# Patient Record
Sex: FEMALE | Race: BLACK OR AFRICAN AMERICAN | NOT HISPANIC OR LATINO
[De-identification: names, ages, dates, MRNs, and addresses within clinical notes are randomized per-mention and may not be internally consistent; named-entity substitution may affect disease eponyms.]

---

## 2020-07-03 ENCOUNTER — TRANSCRIPTION ENCOUNTER (OUTPATIENT)
Age: 30
End: 2020-07-03

## 2023-06-16 ENCOUNTER — EMERGENCY (EMERGENCY)
Facility: HOSPITAL | Age: 33
LOS: 1 days | Discharge: HOME | End: 2023-06-16
Attending: STUDENT IN AN ORGANIZED HEALTH CARE EDUCATION/TRAINING PROGRAM | Admitting: STUDENT IN AN ORGANIZED HEALTH CARE EDUCATION/TRAINING PROGRAM
Payer: MEDICAID

## 2023-06-16 VITALS
HEART RATE: 97 BPM | DIASTOLIC BLOOD PRESSURE: 68 MMHG | TEMPERATURE: 99 F | SYSTOLIC BLOOD PRESSURE: 119 MMHG | RESPIRATION RATE: 16 BRPM | OXYGEN SATURATION: 100 %

## 2023-06-16 LAB
ALBUMIN SERPL ELPH-MCNC: 4.2 G/DL — SIGNIFICANT CHANGE UP (ref 3.3–5)
ALP SERPL-CCNC: 68 U/L — SIGNIFICANT CHANGE UP (ref 40–120)
ALT FLD-CCNC: 11 U/L — SIGNIFICANT CHANGE UP (ref 4–33)
ANION GAP SERPL CALC-SCNC: 13 MMOL/L — SIGNIFICANT CHANGE UP (ref 7–14)
AST SERPL-CCNC: 14 U/L — SIGNIFICANT CHANGE UP (ref 4–32)
BASOPHILS # BLD AUTO: 0.01 K/UL — SIGNIFICANT CHANGE UP (ref 0–0.2)
BASOPHILS NFR BLD AUTO: 0.1 % — SIGNIFICANT CHANGE UP (ref 0–2)
BILIRUB SERPL-MCNC: <0.2 MG/DL — SIGNIFICANT CHANGE UP (ref 0.2–1.2)
BLD GP AB SCN SERPL QL: NEGATIVE — SIGNIFICANT CHANGE UP
BUN SERPL-MCNC: 6 MG/DL — LOW (ref 7–23)
CALCIUM SERPL-MCNC: 9.4 MG/DL — SIGNIFICANT CHANGE UP (ref 8.4–10.5)
CHLORIDE SERPL-SCNC: 101 MMOL/L — SIGNIFICANT CHANGE UP (ref 98–107)
CO2 SERPL-SCNC: 22 MMOL/L — SIGNIFICANT CHANGE UP (ref 22–31)
CREAT SERPL-MCNC: 0.64 MG/DL — SIGNIFICANT CHANGE UP (ref 0.5–1.3)
EGFR: 119 ML/MIN/1.73M2 — SIGNIFICANT CHANGE UP
EOSINOPHIL # BLD AUTO: 0.11 K/UL — SIGNIFICANT CHANGE UP (ref 0–0.5)
EOSINOPHIL NFR BLD AUTO: 1 % — SIGNIFICANT CHANGE UP (ref 0–6)
GLUCOSE SERPL-MCNC: 97 MG/DL — SIGNIFICANT CHANGE UP (ref 70–99)
HCG SERPL-ACNC: SIGNIFICANT CHANGE UP MIU/ML
HCT VFR BLD CALC: 34.3 % — LOW (ref 34.5–45)
HGB BLD-MCNC: 11.3 G/DL — LOW (ref 11.5–15.5)
IANC: 7.93 K/UL — HIGH (ref 1.8–7.4)
IMM GRANULOCYTES NFR BLD AUTO: 0.3 % — SIGNIFICANT CHANGE UP (ref 0–0.9)
LYMPHOCYTES # BLD AUTO: 2.54 K/UL — SIGNIFICANT CHANGE UP (ref 1–3.3)
LYMPHOCYTES # BLD AUTO: 22 % — SIGNIFICANT CHANGE UP (ref 13–44)
MCHC RBC-ENTMCNC: 30.9 PG — SIGNIFICANT CHANGE UP (ref 27–34)
MCHC RBC-ENTMCNC: 32.9 GM/DL — SIGNIFICANT CHANGE UP (ref 32–36)
MCV RBC AUTO: 93.7 FL — SIGNIFICANT CHANGE UP (ref 80–100)
MONOCYTES # BLD AUTO: 0.94 K/UL — HIGH (ref 0–0.9)
MONOCYTES NFR BLD AUTO: 8.1 % — SIGNIFICANT CHANGE UP (ref 2–14)
NEUTROPHILS # BLD AUTO: 7.93 K/UL — HIGH (ref 1.8–7.4)
NEUTROPHILS NFR BLD AUTO: 68.5 % — SIGNIFICANT CHANGE UP (ref 43–77)
NRBC # BLD: 0 /100 WBCS — SIGNIFICANT CHANGE UP (ref 0–0)
NRBC # FLD: 0 K/UL — SIGNIFICANT CHANGE UP (ref 0–0)
PLATELET # BLD AUTO: 338 K/UL — SIGNIFICANT CHANGE UP (ref 150–400)
POTASSIUM SERPL-MCNC: 3.5 MMOL/L — SIGNIFICANT CHANGE UP (ref 3.5–5.3)
POTASSIUM SERPL-SCNC: 3.5 MMOL/L — SIGNIFICANT CHANGE UP (ref 3.5–5.3)
PROT SERPL-MCNC: 7.4 G/DL — SIGNIFICANT CHANGE UP (ref 6–8.3)
RBC # BLD: 3.66 M/UL — LOW (ref 3.8–5.2)
RBC # FLD: 13.2 % — SIGNIFICANT CHANGE UP (ref 10.3–14.5)
RH IG SCN BLD-IMP: POSITIVE — SIGNIFICANT CHANGE UP
SODIUM SERPL-SCNC: 136 MMOL/L — SIGNIFICANT CHANGE UP (ref 135–145)
WBC # BLD: 11.56 K/UL — HIGH (ref 3.8–10.5)
WBC # FLD AUTO: 11.56 K/UL — HIGH (ref 3.8–10.5)

## 2023-06-16 PROCEDURE — 99285 EMERGENCY DEPT VISIT HI MDM: CPT

## 2023-06-16 PROCEDURE — 76817 TRANSVAGINAL US OBSTETRIC: CPT | Mod: 26

## 2023-06-16 NOTE — ED PROVIDER NOTE - PHYSICAL EXAMINATION
GENERAL: Awake, alert, NAD  HEENT: NC/AT, moist mucous membranes, PERRL, EOMI  LUNGS: CTAB, no wheezes or crackles   CARDIAC: RRR, no m/r/g  ABDOMEN: Soft, non tender, non distended, no rebound, no guarding  : cervical os closed, physiological discharge, no blood appreciated.   BACK: No midline spinal tenderness, no CVA tenderness  EXT: No edema, no calf tenderness, 2+ DP pulses bilaterally, no deformities.  NEURO: A&Ox3. Moving all extremities.  SKIN: Warm and dry. No rash.  PSYCH: Normal affect.

## 2023-06-16 NOTE — ED PROVIDER NOTE - NSFOLLOWUPCLINICS_GEN_ALL_ED_FT
[facility]  OB/GYN & Surg  [phone] [place] [address]  Phone: [phone]  Fax:

## 2023-06-16 NOTE — ED PROVIDER NOTE - NSFOLLOWUPINSTRUCTIONS_ED_ALL_ED_FT
Today you were evaluated for no detection of fetal heart rate.   On ultrasound there was evidence of embryonic demise.     Over the next few days you should expect menstruation to happen. If you begin to experience sever pain, become light headed or your are passing large clots, having fevers or chills or please return to the hospital to be evaluates. In the meantime please follow up with our OB/GYN clinic for evaluation for D&C.      Miscarriage  A miscarriage is the loss of a pregnancy before the 20th week of pregnancy. Sometimes, a pregnancy ends before a woman knows that she is pregnant.    If you lose a pregnancy, talk with your doctor about:  Questions you have about the loss of your baby.  How to work through your grief.  Plans for future pregnancy.  What are the causes?  Many times, the cause of this condition is not known.    What increases the risk?  These things may make a pregnant woman more likely to lose a pregnancy:    Certain health conditions    Conditions that affect hormones, such as:  Thyroid disease.  Polycystic ovary syndrome.  Diabetes.  A disease that causes the body's disease-fighting system to attack itself by mistake.  Infections.  Bleeding problems.  Being very overweight.  Lifestyle factors    Using products that have tobacco or nicotine in them.  Being around tobacco smoke.  Having alcohol.  Having a lot of caffeine.  Using drugs.  Problems with reproductive organs or parts    Having a cervix that opens and thins before your due date. The cervix is the lowest part of your womb.  Having Asherman syndrome, which leads to:  Scars in the womb.  The womb being abnormal in shape.  Growths (fibroids) in the womb.  Problems in the body that are present at birth.  Infection of the cervix or womb.  Personal or health history    Injury.  Having lost a pregnancy before.  Being younger than age 18 or older than age 35.  Being around a harmful substance, such as radiation.  Having lead or other heavy metals in:  Things you eat or drink.  The air around you.  Using certain medicines.  What are the signs or symptoms?  Blood or spots of blood coming from the vagina. You may also have cramps or pain.  Pain or cramps in the belly or low back.  Fluid or tissue coming out of the vagina.  How is this treated?  Sometimes, treatment is not needed.    If you need treatment, you may be treated with:  A procedure to open the cervix more and take tissue out of the womb.  Medicines. You may get a shot of medicine called Rho(D) immune globulin.  Follow these instructions at home:  Medicines    Take over-the-counter and prescription medicines only as told by your doctor.  If you were prescribed antibiotic medicine, take it as told by your doctor. Do not stop taking it even if you start to feel better.  Activity    Rest as told by your doctor. Ask your doctor what activities are safe for you.  Have someone help you at home during this time.  General instructions      Watch how much tissue comes out of the vagina.  Watch the size of any blood clots that come out of the vagina.  Do not have sex or douche until your doctor says it is okay.  Do not put things, such as tampons, in your vagina until your doctor says it is okay.  To help you and your partner with grieving:  Talk with your doctor.  See a counselor.  When you are ready, talk with your doctor about:  Things to do for your health.  How you can be healthy if you get pregnant again.  Keep all follow-up visits.  Where to find more information  The American College of Obstetricians and Gynecologists: acog.org  [place] Department of Health and Human Services Office of Women's Health: hrsa.gov/office-womens-health  Contact a doctor if:  You have a fever or chills.  There is bad-smelling fluid coming from the vagina.  You have more bleeding.  Tissue or clots of blood come out of your vagina.  Get help right away if:  You have very bad cramps or pain in your back or belly.  You soak more than 2 large pads in an hour for more than 2 hours.  You get light-headed or weak.  You faint.  You feel sad, and you have sad thoughts a lot of the time.  You think about hurting yourself.  Get help right away if you feel like you may hurt yourself or others, or have thoughts about taking your own life. Go to your nearest emergency room or:  Call your local emergency services (076 in the [place]  Call the National Suicide Prevention Lifeline at 1-570.769.6187 or 976 in the [place] This is open 24 hours a day.  Text the Crisis Text Line at 875137.  Summary  A miscarriage is the loss of a pregnancy before the 20th week of pregnancy. Sometimes, a pregnancy ends before a woman knows that she is pregnant.  Follow instructions from your doctor about medicines and activity.  To help you and your partner with grieving, talk with your doctor or a counselor.  Keep all follow-up visits.  This information is not intended to replace advice given to you by your health care provider. Make sure you discuss any questions you have with your health care provider.

## 2023-06-16 NOTE — ED PROVIDER NOTE - CLINICAL SUMMARY MEDICAL DECISION MAKING FREE TEXT BOX
Will evaluate for fetal demise. Patient does not appear to be displaying any evidence of infection. Will also evaluate for Rh status patient.  Dispo pending lab work and imaging.  We will consider consulting with OB.

## 2023-06-16 NOTE — ED ADULT TRIAGE NOTE - CHIEF COMPLAINT QUOTE
Pregnancy confirmed via U/S by OB on 23. Pt was told there is no heartbeat at yesterday's OBGYN appointment. Denies any vaginal bleeding or pelvic pain. LMP 4/10/23. .

## 2023-06-16 NOTE — ED PROVIDER NOTE - PATIENT PORTAL LINK FT
You can access the FollowMyHealth Patient Portal offered by [facility] by registering at the following website: [email or web address]. By joining [facility]’s FollowMyHealth portal, you will also be able to view your health information using other applications (apps) compatible with our system.

## 2023-06-16 NOTE — ED PROVIDER NOTE - ATTENDING CONTRIBUTION TO CARE
I have personally performed a face to face medical and diagnostic evaluation of the patient. I have discussed with and reviewed the Resident's note and agree with the History, ROS, Physical Exam and MDM unless otherwise indicated. A brief summary of my personal evaluation and impression can be found below.    Nisa BENJAMIN: 33-year-old female no past medical history G1, P0 seen by OB/GYN yesterday about 11 weeks pregnant was told had a concern for possible fetal demise as had an ultrasound yesterday that did not detect a heartbeat, she was told she would likely need a D&C, attempted to follow-up with an outpatient provider, reports however was unable to be seen for weeks prompting her visit tonight, she has no abdominal pain, no vaginal bleeding or discharge and no fever prior to this she had a confirmed viable IUP, no chest pain shortness of breath headache or visual changes she can move everything feeling no urinary or bowel complaints.    All other ROS negative, except as above and as per HPI and ROS section.    VITALS: Initial triage and subsequent vitals have been reviewed by me.  GEN APPEARANCE: Alert, non-toxic, well-appearing, NAD.  HEAD: Atraumatic.  EYES: PERRLa, EOMI, vision grossly intact.   NECK: Supple  CV: RRR, S1S2, no c/r/m/g. Cap refill <2 seconds. No bruits.   LUNGS: CTAB. No abnormal breath sounds.  ABDOMEN: Soft, NTND. No guarding or rebound.   MSK/EXT: No spinal or extremity point tenderness. No CVA ttp. Pelvis stable. No peripheral edema.  NEURO: Alert, follows commands. Weight bearing normal. Speech normal. Sensation and motor normal x4 extremities.   SKIN: Warm, dry and intact. No rash.  PSYCH: Appropriate    Plan/MDM: Exam vital signs stable nontoxic-appearing physical exam as above,  exam as documented by the resident above, patient denies any bleeding at this time, DDx concern for likely missed , low concern for acute septic miscarriage or endometritis as there is no fever no abdominal pain no other discharge no active bleeding at this time, will check basic labs hCG type and screen transvaginal ultrasound, will consider OB/GYN consult as indicated, reassess, anticipate discharge with close OB/GYN follow-up with likely expectant management strict return precautions to be discussed.

## 2023-06-16 NOTE — ED PROVIDER NOTE - OBJECTIVE STATEMENT
33-year-old female most patient medical history presenting with a chief complaint of pregnancy complications.  Patient states that she went to see her OB/GYN yesterday and was told there was no fetal heart rate.  Patient is G1, P0.  LMP was 4/10.  The patient is approximately 9 weeks pregnant.  Patient was told by OB/GYN that that she would likely need a D&C.  Patient states that she contacted the OB that she was referred to for this procedure but was told that they would not be able to fit her in for 3 weeks.  Patient may to the ED today for further evaluation.  Patient is not having any vaginal bleeding or any pain.  Patient had confirmed IUP  Patient denies fevers, chills chest pain, shortness of breath, headache or visual changes.

## 2023-06-16 NOTE — ED PROVIDER NOTE - PROGRESS NOTE DETAILS
She Goss, PGY-1 DO:  Patient TVUS showed fetal demise. Patient to be discharge with expectant management and f/u to OB clinic. Patient is agreeable to this plan.

## 2023-12-27 PROBLEM — Z00.00 ENCOUNTER FOR PREVENTIVE HEALTH EXAMINATION: Status: ACTIVE | Noted: 2023-12-27

## 2023-12-27 PROBLEM — Z78.9 OTHER SPECIFIED HEALTH STATUS: Chronic | Status: ACTIVE | Noted: 2023-06-16

## 2024-01-08 ENCOUNTER — APPOINTMENT (OUTPATIENT)
Dept: OBGYN | Facility: CLINIC | Age: 34
End: 2024-01-08
Payer: COMMERCIAL

## 2024-01-08 VITALS
WEIGHT: 198 LBS | DIASTOLIC BLOOD PRESSURE: 79 MMHG | HEART RATE: 81 BPM | HEIGHT: 61 IN | BODY MASS INDEX: 37.38 KG/M2 | SYSTOLIC BLOOD PRESSURE: 125 MMHG

## 2024-01-08 PROCEDURE — 99203 OFFICE O/P NEW LOW 30 MIN: CPT

## 2024-01-21 ENCOUNTER — TRANSCRIPTION ENCOUNTER (OUTPATIENT)
Age: 34
End: 2024-01-21

## 2024-01-22 ENCOUNTER — APPOINTMENT (OUTPATIENT)
Dept: ANTEPARTUM | Facility: CLINIC | Age: 34
End: 2024-01-22
Payer: COMMERCIAL

## 2024-01-22 ENCOUNTER — ASOB RESULT (OUTPATIENT)
Age: 34
End: 2024-01-22

## 2024-01-22 PROCEDURE — 76811 OB US DETAILED SNGL FETUS: CPT

## 2024-01-29 ENCOUNTER — ASOB RESULT (OUTPATIENT)
Age: 34
End: 2024-01-29

## 2024-01-29 ENCOUNTER — APPOINTMENT (OUTPATIENT)
Dept: ANTEPARTUM | Facility: CLINIC | Age: 34
End: 2024-01-29
Payer: COMMERCIAL

## 2024-01-29 PROCEDURE — 76816 OB US FOLLOW-UP PER FETUS: CPT

## 2024-02-05 ENCOUNTER — NON-APPOINTMENT (OUTPATIENT)
Age: 34
End: 2024-02-05

## 2024-02-05 ENCOUNTER — APPOINTMENT (OUTPATIENT)
Dept: OBGYN | Facility: CLINIC | Age: 34
End: 2024-02-05
Payer: COMMERCIAL

## 2024-02-05 VITALS
HEART RATE: 89 BPM | HEIGHT: 61 IN | SYSTOLIC BLOOD PRESSURE: 114 MMHG | DIASTOLIC BLOOD PRESSURE: 73 MMHG | BODY MASS INDEX: 38.33 KG/M2 | WEIGHT: 203 LBS

## 2024-02-05 VITALS — HEIGHT: 61 IN

## 2024-02-05 PROCEDURE — 0502F SUBSEQUENT PRENATAL CARE: CPT

## 2024-02-06 ENCOUNTER — TRANSCRIPTION ENCOUNTER (OUTPATIENT)
Age: 34
End: 2024-02-06

## 2024-02-12 ENCOUNTER — APPOINTMENT (OUTPATIENT)
Dept: PEDIATRIC CARDIOLOGY | Facility: CLINIC | Age: 34
End: 2024-02-12
Payer: COMMERCIAL

## 2024-02-12 PROCEDURE — 99203 OFFICE O/P NEW LOW 30 MIN: CPT | Mod: 25

## 2024-02-12 PROCEDURE — 93325 DOPPLER ECHO COLOR FLOW MAPG: CPT | Mod: 59

## 2024-02-12 PROCEDURE — 76821 MIDDLE CEREBRAL ARTERY ECHO: CPT

## 2024-02-12 PROCEDURE — 76827 ECHO EXAM OF FETAL HEART: CPT

## 2024-02-12 PROCEDURE — 76825 ECHO EXAM OF FETAL HEART: CPT

## 2024-02-12 PROCEDURE — 76820 UMBILICAL ARTERY ECHO: CPT

## 2024-02-27 ENCOUNTER — NON-APPOINTMENT (OUTPATIENT)
Age: 34
End: 2024-02-27

## 2024-02-27 ENCOUNTER — TRANSCRIPTION ENCOUNTER (OUTPATIENT)
Age: 34
End: 2024-02-27

## 2024-03-06 ENCOUNTER — APPOINTMENT (OUTPATIENT)
Dept: OBGYN | Facility: CLINIC | Age: 34
End: 2024-03-06
Payer: COMMERCIAL

## 2024-03-06 VITALS
HEART RATE: 77 BPM | WEIGHT: 209 LBS | SYSTOLIC BLOOD PRESSURE: 110 MMHG | BODY MASS INDEX: 39.46 KG/M2 | DIASTOLIC BLOOD PRESSURE: 73 MMHG | HEIGHT: 61 IN

## 2024-03-06 DIAGNOSIS — Z34.02 ENCOUNTER FOR SUPERVISION OF NORMAL FIRST PREGNANCY, SECOND TRIMESTER: ICD-10-CM

## 2024-03-06 LAB
BASOPHILS # BLD AUTO: 0.02 K/UL
BASOPHILS NFR BLD AUTO: 0.1 %
EOSINOPHIL # BLD AUTO: 0.08 K/UL
EOSINOPHIL NFR BLD AUTO: 0.6 %
GLUCOSE 1H P 50 G GLC PO SERPL-MCNC: 92 MG/DL
HCT VFR BLD CALC: 31 %
HGB BLD-MCNC: 10.4 G/DL
IMM GRANULOCYTES NFR BLD AUTO: 0.5 %
LYMPHOCYTES # BLD AUTO: 1.63 K/UL
LYMPHOCYTES NFR BLD AUTO: 12.2 %
MAN DIFF?: NORMAL
MCHC RBC-ENTMCNC: 32.8 PG
MCHC RBC-ENTMCNC: 33.5 GM/DL
MCV RBC AUTO: 97.8 FL
MONOCYTES # BLD AUTO: 1.03 K/UL
MONOCYTES NFR BLD AUTO: 7.7 %
NEUTROPHILS # BLD AUTO: 10.54 K/UL
NEUTROPHILS NFR BLD AUTO: 78.9 %
PLATELET # BLD AUTO: 292 K/UL
RBC # BLD: 3.17 M/UL
RBC # FLD: 12.9 %
WBC # FLD AUTO: 13.37 K/UL

## 2024-03-06 PROCEDURE — 0502F SUBSEQUENT PRENATAL CARE: CPT

## 2024-03-07 LAB — T PALLIDUM AB SER QL IA: NEGATIVE

## 2024-03-07 RX ORDER — ASCORBIC ACID 500 MG
500 TABLET ORAL DAILY
Qty: 30 | Refills: 3 | Status: ACTIVE | COMMUNITY
Start: 2024-03-06 | End: 1900-01-01

## 2024-03-18 ENCOUNTER — TRANSCRIPTION ENCOUNTER (OUTPATIENT)
Age: 34
End: 2024-03-18

## 2024-03-18 RX ORDER — FERROUS SULFATE 325(65) MG
325 (65 FE) TABLET ORAL DAILY
Qty: 30 | Refills: 2 | Status: ACTIVE | COMMUNITY
Start: 2024-03-06 | End: 1900-01-01

## 2024-03-27 ENCOUNTER — APPOINTMENT (OUTPATIENT)
Dept: OBGYN | Facility: CLINIC | Age: 34
End: 2024-03-27
Payer: COMMERCIAL

## 2024-03-27 ENCOUNTER — NON-APPOINTMENT (OUTPATIENT)
Age: 34
End: 2024-03-27

## 2024-03-27 VITALS
SYSTOLIC BLOOD PRESSURE: 104 MMHG | BODY MASS INDEX: 39.27 KG/M2 | DIASTOLIC BLOOD PRESSURE: 69 MMHG | HEART RATE: 85 BPM | WEIGHT: 208 LBS | HEIGHT: 61 IN

## 2024-03-27 PROCEDURE — 0502F SUBSEQUENT PRENATAL CARE: CPT

## 2024-04-03 ENCOUNTER — TRANSCRIPTION ENCOUNTER (OUTPATIENT)
Age: 34
End: 2024-04-03

## 2024-04-10 ENCOUNTER — NON-APPOINTMENT (OUTPATIENT)
Age: 34
End: 2024-04-10

## 2024-04-10 ENCOUNTER — APPOINTMENT (OUTPATIENT)
Dept: ANTEPARTUM | Facility: CLINIC | Age: 34
End: 2024-04-10
Payer: COMMERCIAL

## 2024-04-10 ENCOUNTER — APPOINTMENT (OUTPATIENT)
Dept: OBGYN | Facility: CLINIC | Age: 34
End: 2024-04-10
Payer: COMMERCIAL

## 2024-04-10 ENCOUNTER — ASOB RESULT (OUTPATIENT)
Age: 34
End: 2024-04-10

## 2024-04-10 VITALS
SYSTOLIC BLOOD PRESSURE: 119 MMHG | WEIGHT: 209 LBS | HEIGHT: 61 IN | DIASTOLIC BLOOD PRESSURE: 76 MMHG | HEART RATE: 74 BPM | BODY MASS INDEX: 39.46 KG/M2

## 2024-04-10 DIAGNOSIS — Z23 ENCOUNTER FOR IMMUNIZATION: ICD-10-CM

## 2024-04-10 PROCEDURE — 76819 FETAL BIOPHYS PROFIL W/O NST: CPT | Mod: 59

## 2024-04-10 PROCEDURE — 90471 IMMUNIZATION ADMIN: CPT

## 2024-04-10 PROCEDURE — 0502F SUBSEQUENT PRENATAL CARE: CPT

## 2024-04-10 PROCEDURE — 76816 OB US FOLLOW-UP PER FETUS: CPT

## 2024-04-10 PROCEDURE — 90715 TDAP VACCINE 7 YRS/> IM: CPT

## 2024-05-01 ENCOUNTER — NON-APPOINTMENT (OUTPATIENT)
Age: 34
End: 2024-05-01

## 2024-05-01 ENCOUNTER — APPOINTMENT (OUTPATIENT)
Dept: OBGYN | Facility: CLINIC | Age: 34
End: 2024-05-01
Payer: COMMERCIAL

## 2024-05-01 VITALS
DIASTOLIC BLOOD PRESSURE: 73 MMHG | HEART RATE: 91 BPM | BODY MASS INDEX: 40.22 KG/M2 | HEIGHT: 61 IN | WEIGHT: 213 LBS | SYSTOLIC BLOOD PRESSURE: 109 MMHG

## 2024-05-01 PROCEDURE — 0502F SUBSEQUENT PRENATAL CARE: CPT

## 2024-05-15 ENCOUNTER — NON-APPOINTMENT (OUTPATIENT)
Age: 34
End: 2024-05-15

## 2024-05-15 ENCOUNTER — APPOINTMENT (OUTPATIENT)
Dept: OBGYN | Facility: CLINIC | Age: 34
End: 2024-05-15
Payer: COMMERCIAL

## 2024-05-15 VITALS
BODY MASS INDEX: 40.59 KG/M2 | DIASTOLIC BLOOD PRESSURE: 76 MMHG | SYSTOLIC BLOOD PRESSURE: 115 MMHG | HEART RATE: 76 BPM | HEIGHT: 61 IN | WEIGHT: 215 LBS

## 2024-05-15 PROCEDURE — 0502F SUBSEQUENT PRENATAL CARE: CPT

## 2024-05-16 LAB — HIV1+2 AB SPEC QL IA.RAPID: NONREACTIVE

## 2024-05-17 LAB
GP B STREP DNA SPEC QL NAA+PROBE: DETECTED
SOURCE GBS: NORMAL

## 2024-05-21 ENCOUNTER — NON-APPOINTMENT (OUTPATIENT)
Age: 34
End: 2024-05-21

## 2024-05-21 ENCOUNTER — APPOINTMENT (OUTPATIENT)
Dept: OBGYN | Facility: CLINIC | Age: 34
End: 2024-05-21
Payer: COMMERCIAL

## 2024-05-21 VITALS
HEART RATE: 84 BPM | SYSTOLIC BLOOD PRESSURE: 124 MMHG | HEIGHT: 61 IN | BODY MASS INDEX: 40.97 KG/M2 | DIASTOLIC BLOOD PRESSURE: 80 MMHG | WEIGHT: 217 LBS

## 2024-05-21 PROCEDURE — 0502F SUBSEQUENT PRENATAL CARE: CPT

## 2024-05-29 ENCOUNTER — ASOB RESULT (OUTPATIENT)
Age: 34
End: 2024-05-29

## 2024-05-29 ENCOUNTER — APPOINTMENT (OUTPATIENT)
Dept: OBGYN | Facility: CLINIC | Age: 34
End: 2024-05-29
Payer: COMMERCIAL

## 2024-05-29 ENCOUNTER — APPOINTMENT (OUTPATIENT)
Dept: ANTEPARTUM | Facility: CLINIC | Age: 34
End: 2024-05-29
Payer: COMMERCIAL

## 2024-05-29 VITALS
WEIGHT: 218 LBS | SYSTOLIC BLOOD PRESSURE: 107 MMHG | HEART RATE: 94 BPM | DIASTOLIC BLOOD PRESSURE: 73 MMHG | HEIGHT: 61 IN | BODY MASS INDEX: 41.16 KG/M2

## 2024-05-29 PROCEDURE — 76816 OB US FOLLOW-UP PER FETUS: CPT

## 2024-05-29 PROCEDURE — 0502F SUBSEQUENT PRENATAL CARE: CPT

## 2024-05-29 PROCEDURE — 76819 FETAL BIOPHYS PROFIL W/O NST: CPT | Mod: 59

## 2024-06-05 ENCOUNTER — NON-APPOINTMENT (OUTPATIENT)
Age: 34
End: 2024-06-05

## 2024-06-05 ENCOUNTER — APPOINTMENT (OUTPATIENT)
Dept: OBGYN | Facility: CLINIC | Age: 34
End: 2024-06-05
Payer: COMMERCIAL

## 2024-06-05 VITALS
SYSTOLIC BLOOD PRESSURE: 123 MMHG | DIASTOLIC BLOOD PRESSURE: 81 MMHG | HEIGHT: 61 IN | BODY MASS INDEX: 41.72 KG/M2 | HEART RATE: 109 BPM | WEIGHT: 221 LBS

## 2024-06-05 DIAGNOSIS — Z34.03 ENCOUNTER FOR SUPERVISION OF NORMAL FIRST PREGNANCY, THIRD TRIMESTER: ICD-10-CM

## 2024-06-05 PROCEDURE — 0502F SUBSEQUENT PRENATAL CARE: CPT

## 2024-06-10 ENCOUNTER — APPOINTMENT (OUTPATIENT)
Dept: ANTEPARTUM | Facility: CLINIC | Age: 34
End: 2024-06-10
Payer: COMMERCIAL

## 2024-06-10 ENCOUNTER — NON-APPOINTMENT (OUTPATIENT)
Age: 34
End: 2024-06-10

## 2024-06-10 ENCOUNTER — ASOB RESULT (OUTPATIENT)
Age: 34
End: 2024-06-10

## 2024-06-10 ENCOUNTER — APPOINTMENT (OUTPATIENT)
Dept: OBGYN | Facility: CLINIC | Age: 34
End: 2024-06-10

## 2024-06-10 ENCOUNTER — INPATIENT (INPATIENT)
Facility: HOSPITAL | Age: 34
LOS: 2 days | Discharge: HOME | End: 2024-06-13
Attending: STUDENT IN AN ORGANIZED HEALTH CARE EDUCATION/TRAINING PROGRAM | Admitting: STUDENT IN AN ORGANIZED HEALTH CARE EDUCATION/TRAINING PROGRAM
Payer: COMMERCIAL

## 2024-06-10 ENCOUNTER — TRANSCRIPTION ENCOUNTER (OUTPATIENT)
Age: 34
End: 2024-06-10

## 2024-06-10 VITALS — HEART RATE: 93 BPM | SYSTOLIC BLOOD PRESSURE: 120 MMHG | OXYGEN SATURATION: 98 % | DIASTOLIC BLOOD PRESSURE: 74 MMHG

## 2024-06-10 DIAGNOSIS — O60.00 PRETERM LABOR WITHOUT DELIVERY, UNSPECIFIED TRIMESTER: ICD-10-CM

## 2024-06-10 LAB
BASOPHILS # BLD AUTO: 0.03 K/UL — SIGNIFICANT CHANGE UP (ref 0–0.2)
BASOPHILS NFR BLD AUTO: 0.2 % — SIGNIFICANT CHANGE UP (ref 0–2)
BLD GP AB SCN SERPL QL: NEGATIVE — SIGNIFICANT CHANGE UP
EOSINOPHIL # BLD AUTO: 0.12 K/UL — SIGNIFICANT CHANGE UP (ref 0–0.5)
EOSINOPHIL NFR BLD AUTO: 0.8 % — SIGNIFICANT CHANGE UP (ref 0–6)
HCT VFR BLD CALC: 35.8 % — SIGNIFICANT CHANGE UP (ref 34.5–45)
HGB BLD-MCNC: 12.4 G/DL — SIGNIFICANT CHANGE UP (ref 11.5–15.5)
IANC: 10.39 K/UL — HIGH (ref 1.8–7.4)
IMM GRANULOCYTES NFR BLD AUTO: 0.6 % — SIGNIFICANT CHANGE UP (ref 0–0.9)
LYMPHOCYTES # BLD AUTO: 15.9 % — SIGNIFICANT CHANGE UP (ref 13–44)
LYMPHOCYTES # BLD AUTO: 2.29 K/UL — SIGNIFICANT CHANGE UP (ref 1–3.3)
MCHC RBC-ENTMCNC: 33.5 PG — SIGNIFICANT CHANGE UP (ref 27–34)
MCHC RBC-ENTMCNC: 34.6 GM/DL — SIGNIFICANT CHANGE UP (ref 32–36)
MCV RBC AUTO: 96.8 FL — SIGNIFICANT CHANGE UP (ref 80–100)
MONOCYTES # BLD AUTO: 1.48 K/UL — HIGH (ref 0–0.9)
MONOCYTES NFR BLD AUTO: 10.3 % — SIGNIFICANT CHANGE UP (ref 2–14)
NEUTROPHILS # BLD AUTO: 10.39 K/UL — HIGH (ref 1.8–7.4)
NEUTROPHILS NFR BLD AUTO: 72.2 % — SIGNIFICANT CHANGE UP (ref 43–77)
NRBC # BLD: 0 /100 WBCS — SIGNIFICANT CHANGE UP (ref 0–0)
NRBC # FLD: 0 K/UL — SIGNIFICANT CHANGE UP (ref 0–0)
PLATELET # BLD AUTO: 267 K/UL — SIGNIFICANT CHANGE UP (ref 150–400)
RBC # BLD: 3.7 M/UL — LOW (ref 3.8–5.2)
RBC # FLD: 13.3 % — SIGNIFICANT CHANGE UP (ref 10.3–14.5)
RH IG SCN BLD-IMP: POSITIVE — SIGNIFICANT CHANGE UP
WBC # BLD: 14.4 K/UL — HIGH (ref 3.8–10.5)
WBC # FLD AUTO: 14.4 K/UL — HIGH (ref 3.8–10.5)

## 2024-06-10 PROCEDURE — 76818 FETAL BIOPHYS PROFILE W/NST: CPT

## 2024-06-10 RX ORDER — ACETAMINOPHEN 500 MG
650 TABLET ORAL ONCE
Refills: 0 | Status: COMPLETED | OUTPATIENT
Start: 2024-06-10 | End: 2024-06-10

## 2024-06-10 RX ORDER — AMPICILLIN TRIHYDRATE 250 MG
1 CAPSULE ORAL EVERY 4 HOURS
Refills: 0 | Status: DISCONTINUED | OUTPATIENT
Start: 2024-06-10 | End: 2024-06-11

## 2024-06-10 RX ORDER — SODIUM CHLORIDE 9 MG/ML
1000 INJECTION, SOLUTION INTRAVENOUS ONCE
Refills: 0 | Status: DISCONTINUED | OUTPATIENT
Start: 2024-06-10 | End: 2024-06-11

## 2024-06-10 RX ORDER — AMPICILLIN TRIHYDRATE 250 MG
2 CAPSULE ORAL ONCE
Refills: 0 | Status: COMPLETED | OUTPATIENT
Start: 2024-06-10 | End: 2024-06-10

## 2024-06-10 RX ORDER — OXYTOCIN 10 UNIT/ML
333.33 VIAL (ML) INJECTION
Qty: 20 | Refills: 0 | Status: DISCONTINUED | OUTPATIENT
Start: 2024-06-10 | End: 2024-06-11

## 2024-06-10 RX ORDER — SODIUM CHLORIDE 9 MG/ML
1000 INJECTION, SOLUTION INTRAVENOUS
Refills: 0 | Status: DISCONTINUED | OUTPATIENT
Start: 2024-06-10 | End: 2024-06-11

## 2024-06-10 RX ORDER — CHLORHEXIDINE GLUCONATE 213 G/1000ML
1 SOLUTION TOPICAL DAILY
Refills: 0 | Status: DISCONTINUED | OUTPATIENT
Start: 2024-06-10 | End: 2024-06-11

## 2024-06-10 RX ORDER — MORPHINE SULFATE 50 MG/1
4 CAPSULE, EXTENDED RELEASE ORAL ONCE
Refills: 0 | Status: DISCONTINUED | OUTPATIENT
Start: 2024-06-10 | End: 2024-06-10

## 2024-06-10 RX ORDER — SODIUM CHLORIDE 9 MG/ML
500 INJECTION, SOLUTION INTRAVENOUS ONCE
Refills: 0 | Status: DISCONTINUED | OUTPATIENT
Start: 2024-06-10 | End: 2024-06-11

## 2024-06-10 RX ADMIN — Medication 108 GRAM(S): at 20:31

## 2024-06-10 RX ADMIN — MORPHINE SULFATE 4 MILLIGRAM(S): 50 CAPSULE, EXTENDED RELEASE ORAL at 22:50

## 2024-06-10 RX ADMIN — Medication 200 GRAM(S): at 16:32

## 2024-06-10 RX ADMIN — SODIUM CHLORIDE 125 MILLILITER(S): 9 INJECTION, SOLUTION INTRAVENOUS at 16:15

## 2024-06-10 RX ADMIN — MORPHINE SULFATE 4 MILLIGRAM(S): 50 CAPSULE, EXTENDED RELEASE ORAL at 23:15

## 2024-06-10 RX ADMIN — Medication 650 MILLIGRAM(S): at 20:00

## 2024-06-10 RX ADMIN — Medication 650 MILLIGRAM(S): at 20:13

## 2024-06-10 NOTE — OB RN PATIENT PROFILE - NSMATERNALFETALCONCERNS_OBGYN_ALL_OB_FT
Maternal/Fetal Alert  6/10/2024 [facility] recommends a Pediatric Cardiology evaluation secondary to pericardial effusion seen on ultrasound on 6/10/2024 ; patient referred to IOL . Dr Prabhakar Rosas RN 6/10/2024

## 2024-06-10 NOTE — OB PROVIDER H&P - NSMATERNALFETALCONCERNS_OBGYN_ALL_OB_FT
Maternal/Fetal Alert  6/10/2024 [facility] recommends a Pediatric Cardiology evaluation secondary to pericardial effusion seen on ultrasound on 6/10/2024 ; patient referred to IOL . Dr Prabhakar Rosas RN 6/10/2024

## 2024-06-10 NOTE — OB RN PATIENT PROFILE - ALERT: PERTINENT HISTORY
fetal echo/1st Trimester Sonogram/20 Week Level II Sonogram/Non Invasive Prenatal Screen (NIPS)/Fetal Non-Stress Test (NST)/Ultra Screen at 12 Weeks

## 2024-06-10 NOTE — OB PROVIDER H&P - NSHPPHYSICALEXAM_GEN_ALL_CORE
Vital Signs Last 24 Hrs  T(C): 36.9 (10 Alec 2024 14:14), Max: 36.9 (10 Alec 2024 14:14)  T(F): 98.42 (10 Alec 2024 14:14), Max: 98.42 (10 Alec 2024 14:14)  HR: 92 (10 Alec 2024 14:13) (92 - 92)  BP: 120/74 (10 Alec 2024 14:13) (120/74 - 120/74)  BP(mean): --  RR: 16 (10 Alec 2024 14:14) (16 - 16)  SpO2: 98% (10 Alec 2024 14:13) (98% - 98%)        SVE:  FHT: Baseline:  , moderate variability, + accels, - decels  [place]: q irr  Sono: Vertex Vital Signs Last 24 Hrs  T(C): 36.9 (10 Alec 2024 14:14), Max: 36.9 (10 Alec 2024 14:14)  T(F): 98.42 (10 Alec 2024 14:14), Max: 98.42 (10 Alec 2024 14:14)  HR: 92 (10 Alec 2024 14:13) (92 - 92)  BP: 120/74 (10 Alec 2024 14:13) (120/74 - 120/74)  BP(mean): --  RR: 16 (10 Alec 2024 14:14) (16 - 16)  SpO2: 98% (10 Alec 2024 14:13) (98% - 98%)        SVE: 0/0/-3  FHT: Baseline: 130 , moderate variability, + accels, - decels  [place]: q irr  Sono: Vertex

## 2024-06-10 NOTE — OB PROVIDER H&P - HISTORY OF PRESENT ILLNESS
HPI: Pt is a 34y   @40w2d presenting for IOL. Pt seen today with NST with indeterminate baseline and minimal pericardial effusion sent for IOL. PNC otherwise uncomplicated  FM (+)  LOF (-)  CTX (-)  VB (-)  GBS pos  EFW: 3300g      OBHx: SAB sp D&C  GynHx: Denies uterine polyps, ovarian cysts, no abnml paps or STI/STDs. anterior subserosal/IM myoma 7w1x2bp  PMHx: Denies  PSHx: Denies  Med: PNV  All: NKDA  Psych: Denies hx of mental health issues  SH: Denies hx of smoking, drinking, or drug usage during the pregnancy     HPI: Pt is a 34y  @40w2d presenting for IOL. Pt seen today with NST with indeterminate baseline and minimal pericardial effusion in fetus sent for IOL. PNC otherwise uncomplicated.  FM (+)  LOF (-)  CTX (-)  VB (-)  GBS pos  EFW: 3300g      OBHx:  SAB sp D&C  GynHx: Denies uterine polyps, ovarian cysts, no abnml paps or STI/STDs. + anterior subserosal/IM myoma [id number]  PMHx: Denies  PSHx: Denies  Med: PNV  All: NKDA  Psych: Denies hx of mental health issues  SH: Denies hx of smoking, drinking, or drug usage during the pregnancy

## 2024-06-10 NOTE — OB RN PATIENT PROFILE - CURRENT PREGNANCY COMPLICATIONS, OB PROFILE
Maternal Positive GBS fetal pericardial effusion seen on sono/Abnormal Fetal Surveillance/Maternal Positive GBS

## 2024-06-10 NOTE — OB PROVIDER H&P - ASSESSMENT
A/P: Pt is a 34y  who presents for induction of labor.    1. Admit to L&D. Routine Labs. IVF  2. IOL with ***  3. Fetus: Vertex, Reactive/CEFM  4. GBS pos, for Amp  5. Pain: IV pain meds/epidural PRN     Discussed with     Hui Moreau MD, PGY-1  Obstetrics and Gynecology   A/P: Pt is a 34y  who presents for induction of labor.    1. Admit to L&D. Routine Labs. IVF  2. IOL with PO cytotec  3. Fetus: Vertex, Reactive/CEFM  4. GBS pos, for Amp  5. Pain: IV pain meds/epidural PRN     Discussed with Dr. Ellsi Moreau MD, PGY-1  Obstetrics and Gynecology

## 2024-06-10 NOTE — OB RN PATIENT PROFILE - NS_EDDCALCULATED_OBGYN_ALL_OB
Dr Aly Parmar      Date /Time 5/27/2021       12:43 PM EDT  Name Mallory Recio             1963   [facility]  MRN [id number]                Chief Complaint   Patient presents with    Knee Pain     CK RIGHT KNEE        History of Present Illness  Mallory Recio is a 60 y.o. male who presents with  right knee pain,. Current History: Patient did receive a cortisone injection at his last visit. He did incredibly well for approximately 3-4 weeks. His pain has returned. It is mostly concentrated over his lateral joint line. He has had a previous arthroscopic surgery. He is here inquiring about other options for treatment. Previous history: He presents with worsening right knee pain over the last few months. Things have gotten worse since he fell in November 2020. Now he has a 4 out of 10 pain that is achy sharp and popping. Occasionally the pop hurts and occasionally does not. He fell in a building fire pit landed in the fencing. He has tried wearing some knee compressive braces and icing techniques which have not helped. Right knee has undergone knee arthroscopy 2 years ago. Left knee has undergone knee arthroscopy several decades ago after a basketball injury in college.     Past History  Past Medical History:   Diagnosis Date    Arthritis     GERD (gastroesophageal reflux disease)     Hyperlipidemia      Past Surgical History:   Procedure Laterality Date    HAND SURGERY      KNEE ARTHROSCOPY Right 11/16/2017    Medial and Leteral Meniscectomy    KNEE SURGERY      left    LASIK Bilateral     TONSILLECTOMY       Social History     Tobacco Use    Smoking status: Never Smoker    Smokeless tobacco: Never Used   Substance Use Topics    Alcohol use: No     Comment: rarely      Current Outpatient Medications on File Prior to Visit   Medication Sig Dispense Refill    rosuvastatin (CRESTOR) 40 MG tablet Take 40 mg by mouth daily      MULTIPLE VITAMINS-MINERALS PO Take 1 tablet by mouth       No current facility-administered medications on file prior to visit. ASCVD 10-YEAR RISK SCORE  The ASCVD Risk score (Leticia Howard., et al., 2013) failed to calculate for the following reasons: The systolic blood pressure is missing    Cannot find a previous HDL lab    Cannot find a previous total cholesterol lab     Review of Systems  10-point ROS is negative other than HPI. Physical Exam  Based off 1997 Exam Criteria  Ht 6' 2\" (1.88 m)   Wt 215 lb (97.5 kg)   BMI 27.60 kg/m²      Constitutional:       General: He is not in acute distress. Appearance: Normal appearance. Cardiovascular:      Rate and Rhythm: Normal rate and regular rhythm. Pulses: Normal pulses. Pulmonary:      Effort: Pulmonary effort is normal. No respiratory distress. Neurological:      Mental Status: He is alert and oriented to person, place, and time. Mental status is at baseline. Musculoskeletal:  Gait:  antalgic  Lumbar spine: Atraumatic, no radiculopathy. Kevin Hip: Pain-free unrestricted range of motion of the hips. Negative Stinchfield test.    R Knee: Point tenderness along the medial as well as lateral joint line. Mild effusion. Range of motion limited, 5 to 120 degrees. Stable ligamentous exam.  No cutaneous findings. L Knee: No tenderness, no effusion. Range of motion 0 to 130 degrees. Stable ligamentous exam.  Atraumatic      Imaging  Right Knee: [address]  Previous Radiographs: Medial joint space narrowing with osteophytes present, KL grade 2. Minimal osteophytes present inferior pole patella. Left knee shows lateral compartment narrowing. Has a history of posttraumatic pain there from decades ago. Assessment and Plan  [place] was seen today for knee pain. Diagnoses and all orders for this visit:    Primary osteoarthritis of right knee  -     US ARTHR/ASP/INJ MAJOR JNT/BURSA RIGHT;  Future    Right knee pain, unspecified chronicity  -     US ARTHR/ASP/INJ MAJOR JNT/BURSA RIGHT; Future    Chondromalacia, right knee  -     US ARTHR/ASP/INJ MAJOR JNT/BURSA RIGHT; Future    Tear of medial meniscus of right knee, current, unspecified tear type, initial encounter  -     US ARTHR/ASP/INJ MAJOR JNT/BURSA RIGHT; Future    Other orders  -     bupivacaine (MARCAINE) 0.25 % injection 10 mg  -     lidocaine 1 % injection 40 mg  -     triamcinolone acetonide (KENALOG-40) injection 80 mg    Patient does not wish to consider any surgical interventions. We have discussed  braces, heel wedges, viscosupplementation injections, and even PRP and stem cells. We will proceed with a right knee intra-articular cortisone injection today. He will follow-up in 3 months or sooner if problems arise. Based on x-rays patient may actually be a candidate for a lateral unicompartment arthroplasty at some point in the future. He most likely need an MRI before hand. I discussed with Dary Machado that his history, symptoms, signs and imaging are most consistent with knee arthritis. We reviewed the natural history of these conditions and treatment options ranging from conservative measures (rest, icing, activity modification, physical therapy, pain meds, cortisone injection) to surgical options. In terms of treatment, I recommended continuing with rest, icing, avoidance of painful activities, NSAIDs or pain meds as tolerated, and physical therapy. We discussed surgical options as well, should conservative measures fail. I discussed in detail the risks, benefits and complications of aninjection which included but are not limited to infection, skin reactions, hot swollen joint, and anaphylaxis with the patient. The patient verbalized understanding and gave informed consent for the right knee injection. The patient is placed supine on table with a bolster underneath knee. Knee prepped with Betadine/Sterile alcohol solution.  A sterile 22-gauge needle was inserted into the knee and the mixture of 2ml knealog 40mg/cc, 4 mL of 1% plain lidocaine, and 4 cc .25% bupivacaine was injected under sterile technique. The needle was withdrawn and the puncture site sealed with a Band-Aid. Technique: Under sterile conditions a [id number] ultrasound unit with a variable frequency (6.0-15.0 MHz) linear transducer was used to localize the placement of a 22-gauge needle into the knee joint. Findings: Successful needle placement for knee injection. Final images were taken and saved for permanent record. The patient tolerated the injection well. The patient was instructed to call the office immediately if there is any pain, redness, warmth, fever, or chills. First Trimester Sonogram

## 2024-06-11 ENCOUNTER — TRANSCRIPTION ENCOUNTER (OUTPATIENT)
Age: 34
End: 2024-06-11

## 2024-06-11 LAB — T PALLIDUM AB TITR SER: NEGATIVE — SIGNIFICANT CHANGE UP

## 2024-06-11 PROCEDURE — 59515 CESAREAN DELIVERY: CPT

## 2024-06-11 RX ORDER — HEPARIN SODIUM 5000 [USP'U]/ML
10000 INJECTION INTRAVENOUS; SUBCUTANEOUS EVERY 12 HOURS
Refills: 0 | Status: DISCONTINUED | OUTPATIENT
Start: 2024-06-11 | End: 2024-06-13

## 2024-06-11 RX ORDER — LANOLIN
1 OINTMENT (GRAM) TOPICAL EVERY 6 HOURS
Refills: 0 | Status: DISCONTINUED | OUTPATIENT
Start: 2024-06-11 | End: 2024-06-13

## 2024-06-11 RX ORDER — KETOROLAC TROMETHAMINE 30 MG/ML
30 SYRINGE (ML) INJECTION EVERY 6 HOURS
Refills: 0 | Status: DISCONTINUED | OUTPATIENT
Start: 2024-06-11 | End: 2024-06-12

## 2024-06-11 RX ORDER — ACETAMINOPHEN 500 MG
975 TABLET ORAL
Refills: 0 | Status: DISCONTINUED | OUTPATIENT
Start: 2024-06-11 | End: 2024-06-13

## 2024-06-11 RX ORDER — MAGNESIUM HYDROXIDE 400 MG/1
30 TABLET, CHEWABLE ORAL
Refills: 0 | Status: DISCONTINUED | OUTPATIENT
Start: 2024-06-11 | End: 2024-06-13

## 2024-06-11 RX ORDER — TETANUS TOXOID, REDUCED DIPHTHERIA TOXOID AND ACELLULAR PERTUSSIS VACCINE, ADSORBED 5; 2.5; 8; 8; 2.5 [IU]/.5ML; [IU]/.5ML; UG/.5ML; UG/.5ML; UG/.5ML
0.5 SUSPENSION INTRAMUSCULAR ONCE
Refills: 0 | Status: DISCONTINUED | OUTPATIENT
Start: 2024-06-11 | End: 2024-06-13

## 2024-06-11 RX ORDER — IBUPROFEN 200 MG
1 TABLET ORAL
Qty: 0 | Refills: 0 | DISCHARGE
Start: 2024-06-11

## 2024-06-11 RX ORDER — ACETAMINOPHEN 500 MG
3 TABLET ORAL
Qty: 0 | Refills: 0 | DISCHARGE
Start: 2024-06-11

## 2024-06-11 RX ORDER — OXYCODONE HYDROCHLORIDE 5 MG/1
5 TABLET ORAL ONCE
Refills: 0 | Status: DISCONTINUED | OUTPATIENT
Start: 2024-06-11 | End: 2024-06-13

## 2024-06-11 RX ORDER — ACETAMINOPHEN 500 MG
1000 TABLET ORAL ONCE
Refills: 0 | Status: COMPLETED | OUTPATIENT
Start: 2024-06-11 | End: 2024-06-11

## 2024-06-11 RX ORDER — OXYTOCIN 10 UNIT/ML
333.33 VIAL (ML) INJECTION
Qty: 20 | Refills: 0 | Status: DISCONTINUED | OUTPATIENT
Start: 2024-06-11 | End: 2024-06-12

## 2024-06-11 RX ORDER — CITRIC ACID/SODIUM CITRATE 300-500 MG
30 SOLUTION, ORAL ORAL ONCE
Refills: 0 | Status: COMPLETED | OUTPATIENT
Start: 2024-06-11 | End: 2024-06-11

## 2024-06-11 RX ORDER — FAMOTIDINE 10 MG/ML
20 INJECTION INTRAVENOUS ONCE
Refills: 0 | Status: COMPLETED | OUTPATIENT
Start: 2024-06-11 | End: 2024-06-11

## 2024-06-11 RX ORDER — SIMETHICONE 80 MG/1
80 TABLET, CHEWABLE ORAL EVERY 4 HOURS
Refills: 0 | Status: DISCONTINUED | OUTPATIENT
Start: 2024-06-11 | End: 2024-06-13

## 2024-06-11 RX ORDER — SODIUM CHLORIDE 9 MG/ML
1000 INJECTION, SOLUTION INTRAVENOUS
Refills: 0 | Status: DISCONTINUED | OUTPATIENT
Start: 2024-06-11 | End: 2024-06-12

## 2024-06-11 RX ORDER — DIPHENHYDRAMINE HCL 50 MG
25 CAPSULE ORAL EVERY 6 HOURS
Refills: 0 | Status: DISCONTINUED | OUTPATIENT
Start: 2024-06-11 | End: 2024-06-13

## 2024-06-11 RX ORDER — IBUPROFEN 200 MG
600 TABLET ORAL EVERY 6 HOURS
Refills: 0 | Status: COMPLETED | OUTPATIENT
Start: 2024-06-11 | End: 2025-05-10

## 2024-06-11 RX ORDER — OXYCODONE HYDROCHLORIDE 5 MG/1
5 TABLET ORAL
Refills: 0 | Status: DISCONTINUED | OUTPATIENT
Start: 2024-06-11 | End: 2024-06-13

## 2024-06-11 RX ADMIN — FAMOTIDINE 20 MILLIGRAM(S): 10 INJECTION INTRAVENOUS at 11:25

## 2024-06-11 RX ADMIN — SODIUM CHLORIDE 125 MILLILITER(S): 9 INJECTION, SOLUTION INTRAVENOUS at 08:24

## 2024-06-11 RX ADMIN — Medication 108 GRAM(S): at 00:12

## 2024-06-11 RX ADMIN — Medication 108 GRAM(S): at 04:30

## 2024-06-11 RX ADMIN — Medication 30 MILLIGRAM(S): at 23:58

## 2024-06-11 RX ADMIN — Medication 108 GRAM(S): at 08:27

## 2024-06-11 RX ADMIN — Medication 975 MILLIGRAM(S): at 21:25

## 2024-06-11 RX ADMIN — Medication 400 MILLIGRAM(S): at 16:00

## 2024-06-11 RX ADMIN — HEPARIN SODIUM 10000 UNIT(S): 5000 INJECTION INTRAVENOUS; SUBCUTANEOUS at 18:24

## 2024-06-11 RX ADMIN — Medication 1000 MILLIGRAM(S): at 17:00

## 2024-06-11 RX ADMIN — Medication 30 MILLILITER(S): at 11:25

## 2024-06-11 RX ADMIN — CHLORHEXIDINE GLUCONATE 1 APPLICATION(S): 213 SOLUTION TOPICAL at 11:09

## 2024-06-11 RX ADMIN — Medication 975 MILLIGRAM(S): at 22:19

## 2024-06-11 NOTE — OB PROVIDER DELIVERY SUMMARY - NSPROVIDERDELIVERYNOTE_OBGYN_ALL_OB_FT
Procedure: pLTCS  Preop Dx: fetal malpresentation  QBL: 743  IVF:  2.5 L crystalloids  UOP: 800 ml  Layers of uterine closure: 2 with Caprosyn  Complications: none  Specimen: none   Findings: viable female infant in face presentation, light meconium, 7cm anterior fibroid  Hemostatic/Intraoperative agents: none  Baby: F Apgars 6/8, 3175 g    Hui Moreau MD  OBGYN PGY1 Procedure: pLTCS  Preop Dx: fetal malpresentation  QBL: 743  IVF:  2.5 L crystalloids  UOP: 800 ml  Layers of uterine closure: 2 with Caprosyn  Complications: none  Specimen: none   Findings: viable female infant in face presentation extremely hyperexteneded, light meconium, 7cm anterior fibroid  Hemostatic/Intraoperative agents: none  Baby: F Apgars 6/8, 3175 g    Hui Moreau MD  OBGYN PGY1 Procedure: pLTCS  Preop Dx: fetal malpresentation  QBL: 743  IVF:  2.5 L crystalloids  UOP: 800 ml  Layers of uterine closure: 2 with Caprosyn  Complications: none  Specimen: none   Findings: viable female infant in face presentation extremely hyperexteneded, light meconium, 7cm anterior fibroid  Hemostatic/Intraoperative agents: none  Baby: F Apgars 6/8, 3175 g    Dictation #: [id number]    Hui Moreau MD  OBGYN PGY1

## 2024-06-11 NOTE — OB PROVIDER DELIVERY SUMMARY - NSLOWPPHRISK_OBGYN_A_OB
No previous uterine incision/[place] Pregnancy/Less than or equal to 4 previous vaginal births/No known bleeding disorder/No history of postpartum hemorrhage

## 2024-06-11 NOTE — DISCHARGE NOTE OB - CARE PLAN
1 Principal Discharge DX:	Face presentation of fetus  Assessment and plan of treatment:	pelvic rest x 6 weeks

## 2024-06-11 NOTE — DISCHARGE NOTE OB - MEDICATION SUMMARY - MEDICATIONS TO TAKE
I will START or STAY ON the medications listed below when I get home from the hospital:    ibuprofen 600 mg oral tablet  -- 1 tab(s) by mouth every 6 hours  -- Indication: For  section    acetaminophen 325 mg oral tablet  -- 3 tab(s) by mouth 4 times a day as needed for  moderate pain  -- Indication: For  section

## 2024-06-11 NOTE — OB PROVIDER LABOR PROGRESS NOTE - ASSESSMENT
- Plan to move patient to labor floor for possible AROM, pit  - Cont EFM, toco   - Reposition and pain management prn     D/w Dr. Galo Vigil PGY-1 
34y  @40w3d admitted for IOL    - Face presentation palpated, mentum posterior  - MFM to be consulted for possible repositioning and rotation  - Continue cont EFM, toco, IVF    Dr. Rosenthal in room during evaluation  Hui Moreau PGY1
A/P:   - Labor:  IOL for FA. Currently on PO. Cervical balloon placed with speculum, 60/60cc, no issues. Plan to continue PO.  - Fetus: cat 1  - GBS: negative  - Pain: PO Tylenol    Joann Lopez, PGY-1  as per plan from Dr. Radford
Plan   patient to be further evaluated by MFM   holding induction agents at this time   patient counseled that face presentation with mentum posterior is a contraindication to vaginal delivery and would require  delivery     Zandra Harris MD PGY4   Dr. Rosenthal at bedside

## 2024-06-11 NOTE — OB RN DELIVERY SUMMARY - NS_SEPSISRSKCALC_OBGYN_ALL_OB_FT
EOS calculated successfully. EOS Risk Factor: 0.5/1000 live births ([facility] national incidence); GA=40w3d; Temp=99.14; ROM=0; GBS='Positive'; Antibiotics='Broad spectrum antibiotics > 4 hrs prior to birth'

## 2024-06-11 NOTE — OB PROVIDER LABOR PROGRESS NOTE - NS_OBIHIFHRDETAILS_OBGYN_ALL_OB_FT
Baseline 130, mod variability, + accels, - decels
135/mod/+accels, no decels
130, mod valery, intermittent variable decels

## 2024-06-11 NOTE — DISCHARGE NOTE OB - HOSPITAL COURSE
Patient admitted for induction. progressed to approx 4 cm and found to be face presentation with mentum post. Underwent c/section

## 2024-06-11 NOTE — DISCHARGE NOTE OB - PATIENT PORTAL LINK FT
You can access the FollowMyHealth Patient Portal offered by [facility] by registering at the following website: [email or web address]. By joining [facility]’s FollowMyHealth portal, you will also be able to view your health information using other applications (apps) compatible with our system.

## 2024-06-11 NOTE — OB NEONATOLOGY/PEDIATRICIAN DELIVERY SUMMARY - NSMATERNALFETALCONCERNS_OBGYN_ALL_OB_FT
Maternal/Fetal Alert  6/10/2024 [facility] recommends a Pediatric Cardiology evaluation secondary to pericardial effusion seen on ultrasound on 6/10/2024 ; patient referred to IOL . Dr Prabhakar Rosas RN 6/10/2024

## 2024-06-11 NOTE — DISCHARGE NOTE OB - CARE PROVIDER_API CALL
Efrain Radford  Obstetrics and Gynecology  [address], [address]  [place] [address]  Phone: [phone]  Fax: [phone]  Follow Up Time:

## 2024-06-11 NOTE — OB RN DELIVERY SUMMARY - NSMATERNALFETALCONCERNS_OBGYN_ALL_OB_FT
Maternal/Fetal Alert  6/10/2024 [facility] recommends a Pediatric Cardiology evaluation secondary to pericardial effusion seen on ultrasound on 6/10/2024 ; patient referred to IOL . Dr Prabhakar Rosas RN 6/10/2024

## 2024-06-11 NOTE — OB PROVIDER DELIVERY SUMMARY - NSMATERNALFETALCONCERNS_OBGYN_ALL_OB_FT
Maternal/Fetal Alert  6/10/2024 [facility] recommends a Pediatric Cardiology evaluation secondary to pericardial effusion seen on ultrasound on 6/10/2024 ; patient referred to IOL . Dr Prabhakar Rosas RN 6/10/2024

## 2024-06-11 NOTE — OB PROVIDER LABOR PROGRESS NOTE - NS_SUBJECTIVE/OBJECTIVE_OBGYN_ALL_OB_FT
PGY1 Labor & Delivery Progress Note     Pt seen & examined for Cat 2 tracing (variable and late decelerations on exam). CB expelled with minimal tension.     SVE: Unable to feel cervix secondary to bulging membranes.   EFM: 145/mod. variability/-accels/+intermittent variable and late decels  [place]: q2-5 min    T(C): 37.0 (06-11-24 @ 03:05), Max: 37.3 (06-10-24 @ 21:07)  HR: 79 (06-11-24 @ 07:19) (63 - 97)  BP: 96/52 (06-11-24 @ 07:19) (96/52 - 140/75)  RR: 18 (06-11-24 @ 03:05) (16 - 19)  SpO2: 100% (06-11-24 @ 07:18) (88% - 100%)
Pt seen and examined for potential SROM
R1 OB Labor Note    S: Patient seen and examined at bedside. Cervical balloon placed with speculum.    T(C): 37.1 (06-10-24 @ 19:20), Max: 37.1 (06-10-24 @ 19:20)  HR: 79 (06-10-24 @ 19:20) (77 - 92)  BP: 125/63 (06-10-24 @ 19:20) (120/68 - 125/63)  RR: 16 (06-10-24 @ 19:20) (16 - 16)  SpO2: 98% (06-10-24 @ 14:13) (98% - 98%)
R4 Labor Note     Patient examined by NAravindan PGY1 and thought to be face presenting. On exam, nose palpated. Fetus presenting mentum posterior. Membranes intact, bulging bag.

## 2024-06-11 NOTE — OB NEONATOLOGY/PEDIATRICIAN DELIVERY SUMMARY - NSPEDSNEONOTESA_OBGYN_ALL_OB_FT
Baby is a 40.3 wk female born to a 36 y/o  mother via C/S for face presentation. Peds called to delivery for NRFHT, face presentation, and fetal alert for pericardial effusion. Maternal history uncomplicated. Prenatal history significant for minimal pericardial effusion seen on ultrasound on 6/10/2024. Maternal blood type O+. PNL: HIV neg/RPR NR/HBsAg neg/rubella immune. GBS positive on 5/15, s/p Amp x5. ROM at delivery, mec fluids. Maternal temp 37.3C. EOS: 0.04. Baby born with poor tone and color. Warmed, dried, stimulated, deep suction, chest PT. NICU fellow present at delivery. Intermittent CPAP from 10 MOL - 25 MOL for desats 80%. Max 5/40%. Weaned to RA by 30 MOL, stable. Apgars 6/8. Mom plans to breastfeed and consents hepB.   : 24  TOB: 12:17    Physical Exam:  Gen: NAD, +grimace  HEENT: anterior fontanel open soft and flat, no cleft lip/palate, ears normal set, no ear pits or tags. no lesions in mouth/throat, nares clinically patent  Resp: no increased work of breathing, good air entry b/l, clear to auscultation bilaterally  Cardio: Normal S1/S2, regular rate and rhythm, no murmurs, rubs or gallops  Abd: soft, non tender, non distended, + bowel sounds, umbilical cord with 3 vessels  Neuro: +grasp/suck/alida, normal tone  Extremities: negative enriquez and ortolani, moving all extremities, full range of motion x 4, no crepitus  Skin: pink, warm  Genitals: normal female anatomy, Quinten 1, anus patent Baby is a 40.3 wk female born to a 34 y/o  mother via C/S for face presentation. Peds called to delivery for NRFHT, face presentation, and fetal alert for pericardial effusion. Maternal history uncomplicated. Prenatal history significant for minimal pericardial effusion seen on ultrasound on 6/10/2024. Maternal blood type O+. PNL: HIV neg/RPR NR/HBsAg neg/rubella immune. GBS positive on 5/15, s/p Amp x5. ROM at delivery, mec fluids. Maternal temp 37.3C. EOS: 0.04. Baby born with poor tone and color. Warmed, dried, stimulated, deep suction, chest PT. NICU fellow present at delivery. Intermittent CPAP from 10 MOL - 25 MOL for desats 80%. Max 5/40%. Weaned to RA by 30 MOL, stable. NICU attending Mare Morales present at delivery. Apgars 6/8. Mom plans to breastfeed and consents hepB.   : 24  TOB: 12:17    Physical Exam:  Gen: NAD, +grimace  HEENT: anterior fontanel open soft and flat, no cleft lip/palate, ears normal set, no ear pits or tags. no lesions in mouth/throat, nares clinically patent  Resp: no increased work of breathing, good air entry b/l, clear to auscultation bilaterally  Cardio: Normal S1/S2, regular rate and rhythm, no murmurs, rubs or gallops  Abd: soft, non tender, non distended, + bowel sounds, umbilical cord with 3 vessels  Neuro: +grasp/suck/alida, normal tone  Extremities: negative enriquez and ortolani, moving all extremities, full range of motion x 4, no crepitus  Skin: pink, warm  Genitals: normal female anatomy, Quinten 1, anus patent

## 2024-06-11 NOTE — OB RN DELIVERY SUMMARY - NSSELHIDDEN_OBGYN_ALL_OB_FT
[NS_DeliveryAttending1_OBGYN_ALL_OB_FT:[id number]=],[NS_DeliveryAssist1_OBGYN_ALL_OB_FT:[id number]=],[NS_DeliveryRN_OBGYN_ALL_OB_FT:[id number]]

## 2024-06-11 NOTE — OB RN INTRAOPERATIVE NOTE - NSSELHIDDEN_OBGYN_ALL_OB_FT
[NS_DeliveryAttending1_OBGYN_ALL_OB_FT:[id number]=],[NS_DeliveryAssist1_OBGYN_ALL_OB_FT:[id number]=],[NS_DeliveryRN_OBGYN_ALL_OB_FT:[id number]==]

## 2024-06-12 LAB
BASOPHILS # BLD AUTO: 0.02 K/UL — SIGNIFICANT CHANGE UP (ref 0–0.2)
BASOPHILS NFR BLD AUTO: 0.1 % — SIGNIFICANT CHANGE UP (ref 0–2)
EOSINOPHIL # BLD AUTO: 0.02 K/UL — SIGNIFICANT CHANGE UP (ref 0–0.5)
EOSINOPHIL NFR BLD AUTO: 0.1 % — SIGNIFICANT CHANGE UP (ref 0–6)
HCT VFR BLD CALC: 29.1 % — LOW (ref 34.5–45)
HGB BLD-MCNC: 10.2 G/DL — LOW (ref 11.5–15.5)
IANC: 12.74 K/UL — HIGH (ref 1.8–7.4)
IMM GRANULOCYTES NFR BLD AUTO: 0.5 % — SIGNIFICANT CHANGE UP (ref 0–0.9)
LYMPHOCYTES # BLD AUTO: 15 % — SIGNIFICANT CHANGE UP (ref 13–44)
LYMPHOCYTES # BLD AUTO: 2.67 K/UL — SIGNIFICANT CHANGE UP (ref 1–3.3)
MCHC RBC-ENTMCNC: 32.9 PG — SIGNIFICANT CHANGE UP (ref 27–34)
MCHC RBC-ENTMCNC: 35.1 GM/DL — SIGNIFICANT CHANGE UP (ref 32–36)
MCV RBC AUTO: 93.9 FL — SIGNIFICANT CHANGE UP (ref 80–100)
MONOCYTES # BLD AUTO: 2.28 K/UL — HIGH (ref 0–0.9)
MONOCYTES NFR BLD AUTO: 12.8 % — SIGNIFICANT CHANGE UP (ref 2–14)
NEUTROPHILS # BLD AUTO: 12.74 K/UL — HIGH (ref 1.8–7.4)
NEUTROPHILS NFR BLD AUTO: 71.5 % — SIGNIFICANT CHANGE UP (ref 43–77)
NRBC # BLD: 0 /100 WBCS — SIGNIFICANT CHANGE UP (ref 0–0)
NRBC # FLD: 0 K/UL — SIGNIFICANT CHANGE UP (ref 0–0)
PLATELET # BLD AUTO: 231 K/UL — SIGNIFICANT CHANGE UP (ref 150–400)
RBC # BLD: 3.1 M/UL — LOW (ref 3.8–5.2)
RBC # FLD: 13.6 % — SIGNIFICANT CHANGE UP (ref 10.3–14.5)
WBC # BLD: 17.82 K/UL — HIGH (ref 3.8–10.5)
WBC # FLD AUTO: 17.82 K/UL — HIGH (ref 3.8–10.5)

## 2024-06-12 RX ORDER — SENNA PLUS 8.6 MG/1
2 TABLET ORAL AT BEDTIME
Refills: 0 | Status: DISCONTINUED | OUTPATIENT
Start: 2024-06-12 | End: 2024-06-13

## 2024-06-12 RX ORDER — IBUPROFEN 200 MG
600 TABLET ORAL EVERY 6 HOURS
Refills: 0 | Status: DISCONTINUED | OUTPATIENT
Start: 2024-06-12 | End: 2024-06-13

## 2024-06-12 RX ADMIN — Medication 30 MILLIGRAM(S): at 05:00

## 2024-06-12 RX ADMIN — Medication 30 MILLIGRAM(S): at 05:25

## 2024-06-12 RX ADMIN — Medication 600 MILLIGRAM(S): at 18:02

## 2024-06-12 RX ADMIN — Medication 30 MILLIGRAM(S): at 12:48

## 2024-06-12 RX ADMIN — Medication 600 MILLIGRAM(S): at 17:22

## 2024-06-12 RX ADMIN — Medication 975 MILLIGRAM(S): at 20:32

## 2024-06-12 RX ADMIN — Medication 30 MILLIGRAM(S): at 12:06

## 2024-06-12 RX ADMIN — HEPARIN SODIUM 10000 UNIT(S): 5000 INJECTION INTRAVENOUS; SUBCUTANEOUS at 17:22

## 2024-06-12 RX ADMIN — HEPARIN SODIUM 10000 UNIT(S): 5000 INJECTION INTRAVENOUS; SUBCUTANEOUS at 05:02

## 2024-06-12 RX ADMIN — Medication 30 MILLIGRAM(S): at 00:50

## 2024-06-12 RX ADMIN — Medication 1 APPLICATION(S): at 17:21

## 2024-06-12 RX ADMIN — Medication 975 MILLIGRAM(S): at 21:30

## 2024-06-12 NOTE — PROGRESS NOTE ADULT - ASSESSMENT
A/P: 35yo on POD#1 s/p pLTCS for face presentation.  Patient is stable and doing well post-operatively.     #postpartum care   -   - Continue regular diet.  - Increase ambulation.  - HSQ, venodynes for DVT prophylaxis  - Continue motrin, tylenol, oxycodone PRN for pain control  - F/u AM CBC    Alejandro Eden, PGY1

## 2024-06-12 NOTE — LACTATION INITIAL EVALUATION - LACTATION INTERVENTIONS
initiate/review safe skin-to-skin/initiate/review hand expression/initiate/review techniques for position and latch/review techniques to increase milk supply/review techniques to manage sore nipples/engorgement/initiate/review breast massage/compression/reviewed components of an effective feeding and at least 8 effective feedings per day required/reviewed importance of monitoring infant diapers, the breastfeeding log, and minimum output each day/reviewed risks of unnecessary formula supplementation/reviewed risks of artificial nipples/reviewed strategies to transition to breastfeeding only/reviewed benefits and recommendations for rooming in/reviewed feeding on demand/by cue at least 8 times a day/reviewed indications of inadequate milk transfer that would require supplementation

## 2024-06-12 NOTE — LACTATION INITIAL EVALUATION - POTENTIAL FOR
ineffective breastfeeding/sore breast/s/sore nipples/engorgement/feeding confusion/latch on difficulty

## 2024-06-12 NOTE — LACTATION INITIAL EVALUATION - LATCH: HOLD (POSITIONING) INFANT
Body Location Override (Optional - Billing Will Still Be Based On Selected Body Map Location If Applicable): right forehead Mohs Case Number: [id number] Date Of Previous Biopsy (Optional): 1/16/2024 (1) minimal assist, teach one side; mother does other, staff holds Biopsy Photograph Reviewed: Yes Referring Physician (Optional): Stefany Funez Consent Type: Consent 1 (Standard) Eye Shield Used: No Initial Size Of Lesion: 0.7 Number Of Stages: 1 Primary Defect Length In Cm (Final Defect Size - Required For Flaps/Grafts): 1.3 Primary Defect Width In Cm (Final Defect Size - Required For Flaps/Grafts): 1.4 Repair Type: Complex Repair Which Eyelid Repair Cpt Are You Using?: 00697 Oculoplastic Surgeon Procedure Text (A): After obtaining clear surgical margins the patient was sent to oculoplastics for surgical repair.  The patient understands they will receive post-surgical care and follow-up from the referring physician's office. Otolaryngologist Procedure Text (A): After obtaining clear surgical margins the patient was sent to otolaryngology for surgical repair.  The patient understands they will receive post-surgical care and follow-up from the referring physician's office. Plastic Surgeon Procedure Text (A): After obtaining clear surgical margins the patient was sent to plastics for surgical repair.  The patient understands they will receive post-surgical care and follow-up from the referring physician's office. Mid-Level Procedure Text (A): After obtaining clear surgical margins the patient was sent to a mid-level provider for surgical repair.  The patient understands they will receive post-surgical care and follow-up from the mid-level provider. Provider Procedure Text (A): After obtaining clear surgical margins the defect was repaired by another provider. Asc Procedure Text (A): After obtaining clear surgical margins the patient was sent to an ASC for surgical repair.  The patient understands they will receive post-surgical care and follow-up from the ASC physician. Simple / Intermediate / Complex Repair - Final Wound Length In Cm: 3.1 Suturegard Retention Suture: 2-0 Nylon Retention Suture Bite Size: 3 mm Length To Time In Minutes Device Was In Place: 10 Width Of Defect Perpendicular To Closure In Cm (Required): 1.6 Undermining Type: Entire Wound Debridement Text: The wound edges were debrided prior to proceeding with the closure to facilitate wound healing. Helical Rim Text: The closure involved the helical rim. Vermilion Border Text: The closure involved the vermilion border. Nostril Rim Text: The closure involved the nostril rim. Retention Suture Text: Retention sutures were placed to support the closure and prevent dehiscence. Secondary Defect Length In Cm (Required For Flaps): 0 Area H Indication Text: Tumors in this location are included in Area H (eyelids, eyebrows, nose, lips, chin, ear, pre-auricular, post-auricular, temple, genitalia, hands, feet, ankles and areola).  Tissue conservation is critical in these anatomic locations. Area M Indication Text: Tumors in this location are included in Area M (cheek, forehead, scalp, neck, jawline and pretibial skin).  Mohs surgery is indicated for tumors in these anatomic locations. Area L Indication Text: Tumors in this location are included in Area L (trunk and extremities).  Mohs surgery is indicated for larger tumors, or tumors with aggressive histologic features, in these anatomic locations. Tumor Debulked?: curette Depth Of Tumor Invasion (For Histology): tumor not visualized Perineural Invasion (For Histology - Be Specific If Possible): absent Special Stains Stage 1 - Results: Base On Clearance Noted Above Stage 2: Additional Anesthesia Type: 1% lidocaine with epinephrine Staging Info: By selecting yes to the question above you will include information on AJCC 8 tumor staging in your Mohs note. Information on tumor staging will be automatically added for SCCs on the head and neck. AJCC 8 includes tumor size, tumor depth, perineural involvement and bone invasion. Tumor Depth: Less than 6mm from granular layer and no invasion beyond the subcutaneous fat Why Was The Change Made?: Please Select the Appropriate Response Medical Necessity Statement: Based on my medical judgement, Mohs surgery is the most appropriate treatment for this cancer compared to other treatments. Alternatives Discussed Intro (Do Not Add Period): I discussed alternative treatments to Mohs surgery and specifically discussed the risks and benefits of Consent 1/Introductory Paragraph: The rationale for Mohs was explained to the patient and consent was obtained. The risks, benefits and alternatives to therapy were discussed in detail. Specifically, the risks of infection, scarring, bleeding, prolonged wound healing, incomplete removal, allergy to anesthesia, nerve injury and recurrence were addressed. Prior to the procedure, the treatment site was clearly identified and confirmed by the patient. All components of Universal Protocol/PAUSE Rule completed. Consent 2/Introductory Paragraph: Mohs surgery was explained to the patient and consent was obtained. The risks, benefits and alternatives to therapy were discussed in detail. Specifically, the risks of infection, scarring, bleeding, prolonged wound healing, incomplete removal, allergy to anesthesia, nerve injury and recurrence were addressed. Prior to the procedure, the treatment site was clearly identified and confirmed by the patient. All components of Universal Protocol/PAUSE Rule completed. Consent 3/Introductory Paragraph: I gave the patient a chance to ask questions they had about the procedure.  Following this I explained the Mohs procedure and consent was obtained. The risks, benefits and alternatives to therapy were discussed in detail. Specifically, the risks of infection, scarring, bleeding, prolonged wound healing, incomplete removal, allergy to anesthesia, nerve injury and recurrence were addressed. Prior to the procedure, the treatment site was clearly identified and confirmed by the patient. All components of Universal Protocol/PAUSE Rule completed. Consent (Temporal Branch)/Introductory Paragraph: The rationale for Mohs was explained to the patient and consent was obtained. The risks, benefits and alternatives to therapy were discussed in detail. Specifically, the risks of damage to the temporal branch of the facial nerve, infection, scarring, bleeding, prolonged wound healing, incomplete removal, allergy to anesthesia, and recurrence were addressed. Prior to the procedure, the treatment site was clearly identified and confirmed by the patient. All components of Universal Protocol/PAUSE Rule completed. Consent (Marginal Mandibular)/Introductory Paragraph: The rationale for Mohs was explained to the patient and consent was obtained. The risks, benefits and alternatives to therapy were discussed in detail. Specifically, the risks of damage to the marginal mandibular branch of the facial nerve, infection, scarring, bleeding, prolonged wound healing, incomplete removal, allergy to anesthesia, and recurrence were addressed. Prior to the procedure, the treatment site was clearly identified and confirmed by the patient. All components of Universal Protocol/PAUSE Rule completed. Consent (Spinal Accessory)/Introductory Paragraph: The rationale for Mohs was explained to the patient and consent was obtained. The risks, benefits and alternatives to therapy were discussed in detail. Specifically, the risks of damage to the spinal accessory nerve, infection, scarring, bleeding, prolonged wound healing, incomplete removal, allergy to anesthesia, and recurrence were addressed. Prior to the procedure, the treatment site was clearly identified and confirmed by the patient. All components of Universal Protocol/PAUSE Rule completed. Consent (Near Eyelid Margin)/Introductory Paragraph: The rationale for Mohs was explained to the patient and consent was obtained. The risks, benefits and alternatives to therapy were discussed in detail. Specifically, the risks of ectropion or eyelid deformity, infection, scarring, bleeding, prolonged wound healing, incomplete removal, allergy to anesthesia, nerve injury and recurrence were addressed. Prior to the procedure, the treatment site was clearly identified and confirmed by the patient. All components of Universal Protocol/PAUSE Rule completed. Consent (Ear)/Introductory Paragraph: The rationale for Mohs was explained to the patient and consent was obtained. The risks, benefits and alternatives to therapy were discussed in detail. Specifically, the risks of ear deformity, infection, scarring, bleeding, prolonged wound healing, incomplete removal, allergy to anesthesia, nerve injury and recurrence were addressed. Prior to the procedure, the treatment site was clearly identified and confirmed by the patient. All components of Universal Protocol/PAUSE Rule completed. Consent (Nose)/Introductory Paragraph: The rationale for Mohs was explained to the patient and consent was obtained. The risks, benefits and alternatives to therapy were discussed in detail. Specifically, the risks of nasal deformity, changes in the flow of air through the nose, infection, scarring, bleeding, prolonged wound healing, incomplete removal, allergy to anesthesia, nerve injury and recurrence were addressed. Prior to the procedure, the treatment site was clearly identified and confirmed by the patient. All components of Universal Protocol/PAUSE Rule completed. Consent (Lip)/Introductory Paragraph: The rationale for Mohs was explained to the patient and consent was obtained. The risks, benefits and alternatives to therapy were discussed in detail. Specifically, the risks of lip deformity, changes in the oral aperture, infection, scarring, bleeding, prolonged wound healing, incomplete removal, allergy to anesthesia, nerve injury and recurrence were addressed. Prior to the procedure, the treatment site was clearly identified and confirmed by the patient. All components of Universal Protocol/PAUSE Rule completed. Consent (Scalp)/Introductory Paragraph: The rationale for Mohs was explained to the patient and consent was obtained. The risks, benefits and alternatives to therapy were discussed in detail. Specifically, the risks of changes in hair growth pattern secondary to repair, infection, scarring, bleeding, prolonged wound healing, incomplete removal, allergy to anesthesia, nerve injury and recurrence were addressed. Prior to the procedure, the treatment site was clearly identified and confirmed by the patient. All components of Universal Protocol/PAUSE Rule completed. Detail Level: Detailed Postop Diagnosis: same Surgeon: Patrick Turner MD Anesthesia Volume In Cc: 5 Hemostasis: Electrocautery and electrocoagulation Estimated Blood Loss (Cc): minimal Repair Anesthesia Method: local infiltration Anesthesia Volume In Cc: 4 Undermining Location (Optional): in the superficial subcutaneous fat Brow Lift Text: A midfrontal incision was made medially to the defect to allow access to the tissues just superior to the left eyebrow. Following careful dissection inferiorly in a supraperiosteal plane to the level of the left eyebrow, several 3-0 monocryl sutures were used to resuspend the eyebrow orbicularis oculi muscular unit to the superior frontal bone periosteum. This resulted in an appropriate reapproximation of static eyebrow symmetry and correction of the left brow ptosis. Deep Sutures: 4-0 Vicryl Epidermal Sutures: 6-0 Plain Gut Epidermal Closure: running Suturegard Intro: Intraoperative tissue expansion was performed, utilizing the SUTUREGARD device, in order to reduce wound tension. Suturegard Body: The suture ends were repeatedly re-tightened and re-clamped to achieve the desired tissue expansion. Hemigard Intro: Due to skin fragility and wound tension, it was decided to use HEMIGARD adhesive retention suture devices to permit a linear closure. The skin was cleaned and dried for a 6cm distance away from the wound. Excessive hair, if present, was removed to allow for adhesion. Hemigard Postcare Instructions: The HEMIGARD strips are to remain completely dry for at least 5-7 days. Donor Site Anesthesia Type: same as repair anesthesia Graft Donor Site Dermal Sutures (Optional): 5-0 Vicryl Epidermal Closure Graft Donor Site (Optional): simple interrupted Graft Donor Site Bandage (Optional-Leave Blank If You Don't Want In Note): Telfa and a pressure bandage were applied to the donor site. Closure 2 Information: This tab is for additional flaps and grafts, including complex repair and grafts and complex repair and flaps. You can also specify a different location for the additional defect, if the location is the same you do not need to select a new one. We will insert the automated text for the repair you select below just as we do for solitary flaps and grafts. Please note that at this time if you select a location with a different insurance zone you will need to override the ICD10 and CPT if appropriate. Closure 3 Information: This tab is for additional flaps and grafts above and beyond our usual structured repairs.  Please note if you enter information here it will not currently bill and you will need to add the billing information manually. Wound Care: Petrolatum Dressing: dry sterile dressing Unna Boot Text: An Unna boot was placed to help immobilize the limb and facilitate more rapid healing. Home Suture Removal Text: Patient was provided instructions on removing sutures and will remove their sutures at home.  If they have any questions or difficulties they will call the office. Post-Care Instructions: I reviewed with the patient in detail post-care instructions. Patient is not to engage in any heavy lifting, exercise, or swimming for the next 14 days. Should the patient develop any fevers, chills, bleeding, severe pain patient will contact the office immediately. Pain Refusal Text: I offered to prescribe pain medication but the patient refused to take this medication. Mauc Instructions: By selecting yes to the question below the MAUC number will be added into the note.  This will be calculated automatically based on the diagnosis chosen, the size entered, the body zone selected (H,M,L) and the specific indications you chose. You will also have the option to override the Mohs AUC if you disagree with the automatically calculated number and this option is found in the Case Summary tab. Where Do You Want The Question To Include Opioid Counseling Located?: Case Summary Tab Eye Protection Verbiage: Before proceeding with the stage, a plastic scleral shield was inserted. The globe was anesthetized with a few drops of 1% lidocaine with 1:100,000 epinephrine. Then, an appropriate sized scleral shield was chosen and coated with lacrilube ointment. The shield was gently inserted and left in place for the duration of each stage. After the stage was completed, the shield was gently removed. Mohs Method Verbiage: An incision at a 45 degree angle following the standard Mohs approach was done and the specimen was harvested as a microscopic controlled layer. Surgeon/Pathologist Verbiage (Will Incorporate Name Of Surgeon From Intro If Not Blank): operated in two distinct and integrated capacities as the surgeon and pathologist. Mohs Histo Method Verbiage: Each section was then chromacoded and processed in the Mohs lab using the Mohs protocol and submitted for frozen section, utilizing hematoxylin and eosin histochemical stains. Subsequent Stages Histo Method Verbiage: Using a similar technique to that described above, a thin layer of tissue was removed from all areas where tumor was visible on the previous stage.  The tissue was again oriented, mapped, dyed, and processed as above. Mohs Rapid Report Verbiage: The area of clinically evident tumor was marked with skin marking ink and appropriately hatched.  The initial incision was made following the Mohs approach through the skin.  The specimen was taken to the lab, divided into the necessary number of pieces, chromacoded and processed according to the Mohs protocol.  This was repeated in successive stages until a tumor free defect was achieved. Complex Repair Preamble Text (Leave Blank If You Do Not Want): Extensive wide undermining was performed. Intermediate Repair Preamble Text (Leave Blank If You Do Not Want): Undermining was performed with blunt dissection. Non-Graft Cartilage Fenestration Text: The cartilage was fenestrated with a 2mm punch biopsy to help facilitate healing. Graft Cartilage Fenestration Text: The cartilage was fenestrated with a 2mm punch biopsy to help facilitate graft survival and healing. Secondary Intention Text (Leave Blank If You Do Not Want): The defect will heal with secondary intention. No Repair - Repaired With Adjacent Surgical Defect Text (Leave Blank If You Do Not Want): After obtaining clear surgical margins the defect was repaired concurrently with another surgical defect which was in close approximation. Adjacent Tissue Transfer Text: The defect edges were debeveled with a #15 scalpel blade. Given the location of the defect and the proximity to free margins an adjacent tissue transfer was deemed most appropriate. Using a sterile surgical marker, an appropriate flap was drawn incorporating the defect and placing the expected incisions within the relaxed skin tension lines where possible. The area thus outlined was incised deep to adipose tissue with a #15 scalpel blade. The skin margins were undermined to an appropriate distance in all directions utilizing iris scissors and carried over to close the primary defect. Advancement Flap (Single) Text: The defect edges were debeveled with a #15 scalpel blade. Given the location of the defect and the proximity to free margins a single advancement flap was deemed most appropriate. Using a sterile surgical marker, an appropriate advancement flap was drawn incorporating the defect and placing the expected incisions within the relaxed skin tension lines where possible. The area thus outlined was incised deep to adipose tissue with a #15 scalpel blade. The skin margins were undermined to an appropriate distance in all directions utilizing iris scissors. Following this, the designed flap was advanced and carried over into the primary defect and sutured into place. Advancement Flap (Double) Text: The defect edges were debeveled with a #15 scalpel blade. Given the location of the defect and the proximity to free margins a double advancement flap was deemed most appropriate. Using a sterile surgical marker, the appropriate advancement flaps were drawn incorporating the defect and placing the expected incisions within the relaxed skin tension lines where possible. The area thus outlined was incised deep to adipose tissue with a #15 scalpel blade. The skin margins were undermined to an appropriate distance in all directions utilizing iris scissors. Following this, the designed flaps were advanced and carried over into the primary defect and sutured into place. Burow's Advancement Flap Text: The defect edges were debeveled with a #15 scalpel blade. Given the location of the defect and the proximity to free margins a Burow's advancement flap was deemed most appropriate. Using a sterile surgical marker, the appropriate advancement flap was drawn incorporating the defect and placing the expected incisions within the relaxed skin tension lines where possible. The area thus outlined was incised deep to adipose tissue with a #15 scalpel blade. The skin margins were undermined to an appropriate distance in all directions utilizing iris scissors. Following this, the designed flap was advanced and carried over into the primary defect and sutured into place. Chonodrocutaneous Helical Advancement Flap Text: The defect edges were debeveled with a #15 scalpel blade. Given the location of the defect and the proximity to free margins a chondrocutaneous helical advancement flap was deemed most appropriate. Using a sterile surgical marker, the appropriate advancement flap was drawn incorporating the defect and placing the expected incisions within the relaxed skin tension lines where possible. The area thus outlined was incised deep to adipose tissue with a #15 scalpel blade. The skin margins were undermined to an appropriate distance in all directions utilizing iris scissors. Following this, the designed flap was advanced and carried over into the primary defect and sutured into place. Crescentic Advancement Flap Text: The defect edges were debeveled with a #15 scalpel blade. Given the location of the defect and the proximity to free margins a crescentic advancement flap was deemed most appropriate. Using a sterile surgical marker, the appropriate advancement flap was drawn incorporating the defect and placing the expected incisions within the relaxed skin tension lines where possible. The area thus outlined was incised deep to adipose tissue with a #15 scalpel blade. The skin margins were undermined to an appropriate distance in all directions utilizing iris scissors. Following this, the designed flap was advanced and carried over into the primary defect and sutured into place. A-T Advancement Flap Text: The defect edges were debeveled with a #15 scalpel blade. Given the location of the defect, shape of the defect and the proximity to free margins an A-T advancement flap was deemed most appropriate. Using a sterile surgical marker, an appropriate advancement flap was drawn incorporating the defect and placing the expected incisions within the relaxed skin tension lines where possible. The area thus outlined was incised deep to adipose tissue with a #15 scalpel blade. The skin margins were undermined to an appropriate distance in all directions utilizing iris scissors. Following this, the designed flap was advanced and carried over into the primary defect and sutured into place. O-T Advancement Flap Text: The defect edges were debeveled with a #15 scalpel blade. Given the location of the defect, shape of the defect and the proximity to free margins an O-T advancement flap was deemed most appropriate. Using a sterile surgical marker, an appropriate advancement flap was drawn incorporating the defect and placing the expected incisions within the relaxed skin tension lines where possible. The area thus outlined was incised deep to adipose tissue with a #15 scalpel blade. The skin margins were undermined to an appropriate distance in all directions utilizing iris scissors. Following this, the designed flap was advanced and carried over into the primary defect and sutured into place. O-L Flap Text: The defect edges were debeveled with a #15 scalpel blade. Given the location of the defect, shape of the defect and the proximity to free margins an O-L flap was deemed most appropriate. Using a sterile surgical marker, an appropriate advancement flap was drawn incorporating the defect and placing the expected incisions within the relaxed skin tension lines where possible. The area thus outlined was incised deep to adipose tissue with a #15 scalpel blade. The skin margins were undermined to an appropriate distance in all directions utilizing iris scissors. Following this, the designed flap was advanced and carried over into the primary defect and sutured into place. O-Z Flap Text: The defect edges were debeveled with a #15 scalpel blade. Given the location of the defect, shape of the defect and the proximity to free margins an O-Z flap was deemed most appropriate. Using a sterile surgical marker, an appropriate transposition flap was drawn incorporating the defect and placing the expected incisions within the relaxed skin tension lines where possible. The area thus outlined was incised deep to adipose tissue with a #15 scalpel blade. The skin margins were undermined to an appropriate distance in all directions utilizing iris scissors. Following this, the designed flap was carried over into the primary defect and sutured into place. Double O-Z Flap Text: The defect edges were debeveled with a #15 scalpel blade. Given the location of the defect, shape of the defect and the proximity to free margins a Double O-Z flap was deemed most appropriate. Using a sterile surgical marker, an appropriate transposition flap was drawn incorporating the defect and placing the expected incisions within the relaxed skin tension lines where possible. The area thus outlined was incised deep to adipose tissue with a #15 scalpel blade. The skin margins were undermined to an appropriate distance in all directions utilizing iris scissors. Following this, the designed flap was carried over into the primary defect and sutured into place. V-Y Flap Text: The defect edges were debeveled with a #15 scalpel blade. Given the location of the defect, shape of the defect and the proximity to free margins a V-Y flap was deemed most appropriate. Using a sterile surgical marker, an appropriate advancement flap was drawn incorporating the defect and placing the expected incisions within the relaxed skin tension lines where possible. The area thus outlined was incised deep to adipose tissue with a #15 scalpel blade. The skin margins were undermined to an appropriate distance in all directions utilizing iris scissors. Following this, the designed flap was advanced and carried over into the primary defect and sutured into place. Advancement-Rotation Flap Text: The defect edges were debeveled with a #15 scalpel blade. Given the location of the defect, shape of the defect and the proximity to free margins an advancement-rotation flap was deemed most appropriate. Using a sterile surgical marker, an appropriate flap was drawn incorporating the defect and placing the expected incisions within the relaxed skin tension lines where possible. The area thus outlined was incised deep to adipose tissue with a #15 scalpel blade. The skin margins were undermined to an appropriate distance in all directions utilizing iris scissors. Following this, the designed flap was carried over into the primary defect and sutured into place. Mercedes Flap Text: The defect edges were debeveled with a #15 scalpel blade. Given the location of the defect, shape of the defect and the proximity to free margins a Mercedes flap was deemed most appropriate. Using a sterile surgical marker, an appropriate advancement flap was drawn incorporating the defect and placing the expected incisions within the relaxed skin tension lines where possible. The area thus outlined was incised deep to adipose tissue with a #15 scalpel blade. The skin margins were undermined to an appropriate distance in all directions utilizing iris scissors. Following this, the designed flap was advanced and carried over into the primary defect and sutured into place. Modified Advancement Flap Text: The defect edges were debeveled with a #15 scalpel blade. Given the location of the defect, shape of the defect and the proximity to free margins a modified advancement flap was deemed most appropriate. Using a sterile surgical marker, an appropriate advancement flap was drawn incorporating the defect and placing the expected incisions within the relaxed skin tension lines where possible. The area thus outlined was incised deep to adipose tissue with a #15 scalpel blade. The skin margins were undermined to an appropriate distance in all directions utilizing iris scissors. Following this, the designed flap was advanced and carried over into the primary defect and sutured into place. Mucosal Advancement Flap Text: Given the location of the defect, shape of the defect and the proximity to free margins a mucosal advancement flap was deemed most appropriate. Incisions were made with a 15 blade scalpel in the appropriate fashion along the cutaneous vermilion border and the mucosal lip. The remaining actinically damaged mucosal tissue was excised.  The mucosal advancement flap was then elevated to the gingival sulcus with care taken to preserve the neurovascular structures and advanced into the primary defect. Care was taken to ensure that precise realignment of the vermilion border was achieved. Peng Advancement Flap Text: The defect edges were debeveled with a #15 scalpel blade. Given the location of the defect, shape of the defect and the proximity to free margins a Peng advancement flap was deemed most appropriate. Using a sterile surgical marker, an appropriate advancement flap was drawn incorporating the defect and placing the expected incisions within the relaxed skin tension lines where possible. The area thus outlined was incised deep to adipose tissue with a #15 scalpel blade. The skin margins were undermined to an appropriate distance in all directions utilizing iris scissors. Following this, the designed flap was advanced and carried over into the primary defect and sutured into place. Hatchet Flap Text: The defect edges were debeveled with a #15 scalpel blade. Given the location of the defect, shape of the defect and the proximity to free margins a hatchet flap was deemed most appropriate. Using a sterile surgical marker, an appropriate hatchet flap was drawn incorporating the defect and placing the expected incisions within the relaxed skin tension lines where possible. The area thus outlined was incised deep to adipose tissue with a #15 scalpel blade. The skin margins were undermined to an appropriate distance in all directions utilizing iris scissors. Following this, the designed flap was carried over into the primary defect and sutured into place. Rotation Flap Text: The defect edges were debeveled with a #15 scalpel blade. Given the location of the defect, shape of the defect and the proximity to free margins a rotation flap was deemed most appropriate. Using a sterile surgical marker, an appropriate rotation flap was drawn incorporating the defect and placing the expected incisions within the relaxed skin tension lines where possible. The area thus outlined was incised deep to adipose tissue with a #15 scalpel blade. The skin margins were undermined to an appropriate distance in all directions utilizing iris scissors. Following this, the designed flap was carried over into the primary defect and sutured into place. Bilateral Rotation Flap Text: The defect edges were debeveled with a #15 scalpel blade. Given the location of the defect, shape of the defect and the proximity to free margins a bilateral rotation flap was deemed most appropriate. Using a sterile surgical marker, an appropriate rotation flap was drawn incorporating the defect and placing the expected incisions within the relaxed skin tension lines where possible. The area thus outlined was incised deep to adipose tissue with a #15 scalpel blade. The skin margins were undermined to an appropriate distance in all directions utilizing iris scissors. Following this, the designed flap was carried over into the primary defect and sutured into place. Spiral Flap Text: The defect edges were debeveled with a #15 scalpel blade. Given the location of the defect, shape of the defect and the proximity to free margins a spiral flap was deemed most appropriate. Using a sterile surgical marker, an appropriate rotation flap was drawn incorporating the defect and placing the expected incisions within the relaxed skin tension lines where possible. The area thus outlined was incised deep to adipose tissue with a #15 scalpel blade. The skin margins were undermined to an appropriate distance in all directions utilizing iris scissors. Following this, the designed flap was carried over into the primary defect and sutured into place. Staged Advancement Flap Text: The defect edges were debeveled with a #15 scalpel blade. Given the location of the defect, shape of the defect and the proximity to free margins a staged advancement flap was deemed most appropriate. Using a sterile surgical marker, an appropriate advancement flap was drawn incorporating the defect and placing the expected incisions within the relaxed skin tension lines where possible. The area thus outlined was incised deep to adipose tissue with a #15 scalpel blade. The skin margins were undermined to an appropriate distance in all directions utilizing iris scissors. Following this, the designed flap was carried over into the primary defect and sutured into place. Star Wedge Flap Text: The defect edges were debeveled with a #15 scalpel blade. Given the location of the defect, shape of the defect and the proximity to free margins a star wedge flap was deemed most appropriate. Using a sterile surgical marker, an appropriate rotation flap was drawn incorporating the defect and placing the expected incisions within the relaxed skin tension lines where possible. The area thus outlined was incised deep to adipose tissue with a #15 scalpel blade. The skin margins were undermined to an appropriate distance in all directions utilizing iris scissors. Following this, the designed flap was carried over into the primary defect and sutured into place. Transposition Flap Text: The defect edges were debeveled with a #15 scalpel blade. Given the location of the defect and the proximity to free margins a transposition flap was deemed most appropriate. Using a sterile surgical marker, an appropriate transposition flap was drawn incorporating the defect. The area thus outlined was incised deep to adipose tissue with a #15 scalpel blade. The skin margins were undermined to an appropriate distance in all directions utilizing iris scissors. Following this, the designed flap was carried over into the primary defect and sutured into place. Muscle Hinge Flap Text: The defect edges were debeveled with a #15 scalpel blade.  Given the size, depth and location of the defect and the proximity to free margins a muscle hinge flap was deemed most appropriate. Using a sterile surgical marker, an appropriate hinge flap was drawn incorporating the defect. The area thus outlined was incised with a #15 scalpel blade. The skin margins were undermined to an appropriate distance in all directions utilizing iris scissors. Following this, the designed flap was carried into the primary defect and sutured into place. Mustarde Flap Text: The defect edges were debeveled with a #15 scalpel blade.  Given the size, depth and location of the defect and the proximity to free margins a Mustarde flap was deemed most appropriate. Using a sterile surgical marker, an appropriate flap was drawn incorporating the defect. The area thus outlined was incised with a #15 scalpel blade. The skin margins were undermined to an appropriate distance in all directions utilizing iris scissors. Following this, the designed flap was carried into the primary defect and sutured into place. Nasal Turnover Hinge Flap Text: The defect edges were debeveled with a #15 scalpel blade.  Given the size, depth, location of the defect and the defect being full thickness a nasal turnover hinge flap was deemed most appropriate. Using a sterile surgical marker, an appropriate hinge flap was drawn incorporating the defect. The area thus outlined was incised with a #15 scalpel blade. The flap was designed to recreate the nasal mucosal lining and the alar rim. The skin margins were undermined to an appropriate distance in all directions utilizing iris scissors. Following this, the designed flap was carried over into the primary defect and sutured into place Nasalis-Muscle-Based Myocutaneous Island Pedicle Flap Text: Using a #15 blade, an incision was made around the donor flap to the level of the nasalis muscle. Wide lateral undermining was then performed in both the subcutaneous plane above the nasalis muscle, and in a submuscular plane just above periosteum. This allowed the formation of a free nasalis muscle axial pedicle (based on the angular artery) which was still attached to the actual cutaneous flap, increasing its mobility and vascular viability. Hemostasis was obtained with pinpoint electrocoagulation. The flap was mobilized into position and the pivotal anchor points positioned and stabilized with buried interrupted sutures. Subcutaneous and dermal tissues were closed in a multilayered fashion with sutures. Tissue redundancies were excised, and the epidermal edges were apposed without significant tension and sutured with sutures. Nasalis Myocutaneous Flap Text: Using a #15 blade, an incision was made around the donor flap to the level of the nasalis muscle. Wide lateral undermining was then performed in both the subcutaneous plane above the nasalis muscle, and in a submuscular plane just above periosteum. This allowed the formation of a free nasalis muscle axial pedicle which was still attached to the actual cutaneous flap, increasing its mobility and vascular viability. Hemostasis was obtained with pinpoint electrocoagulation. The flap was mobilized into position and the pivotal anchor points positioned and stabilized with buried interrupted sutures. Subcutaneous and dermal tissues were closed in a multilayered fashion with sutures. Tissue redundancies were excised, and the epidermal edges were apposed without significant tension and sutured with sutures. Orbicularis Oris Muscle Flap Text: The defect edges were debeveled with a #15 scalpel blade.  Given that the defect affected the competency of the oral sphincter an orbicularis oris muscle flap was deemed most appropriate to restore this competency and normal muscle function.  Using a sterile surgical marker, an appropriate flap was drawn incorporating the defect. The area thus outlined was incised with a #15 scalpel blade. Following this, the designed flap was carried over into the primary defect and sutured into place. Melolabial Transposition Flap Text: The defect edges were debeveled with a #15 scalpel blade. Given the location of the defect and the proximity to free margins a melolabial flap was deemed most appropriate. Using a sterile surgical marker, an appropriate melolabial transposition flap was drawn incorporating the defect. The area thus outlined was incised deep to adipose tissue with a #15 scalpel blade. The skin margins were undermined to an appropriate distance in all directions utilizing iris scissors. Following this, the designed flap was carried over into the primary defect and sutured into place. Rectangular Flap Text: The defect edges were debeveled with a #15 scalpel blade. Given the location of the defect and the proximity to free margins a rectangular flap was deemed most appropriate. Using a sterile surgical marker, an appropriate rectangular flap was drawn incorporating the defect. The area thus outlined was incised deep to adipose tissue with a #15 scalpel blade. The skin margins were undermined to an appropriate distance in all directions utilizing iris scissors. Following this, the designed flap was carried over into the primary defect and sutured into place. Rhombic Flap Text: The defect edges were debeveled with a #15 scalpel blade. Given the location of the defect and the proximity to free margins a rhombic flap was deemed most appropriate. Using a sterile surgical marker, an appropriate rhombic flap was drawn incorporating the defect. The area thus outlined was incised deep to adipose tissue with a #15 scalpel blade. The skin margins were undermined to an appropriate distance in all directions utilizing iris scissors. Following this, the designed flap was carried over into the primary defect and sutured into place. Rhomboid Transposition Flap Text: The defect edges were debeveled with a #15 scalpel blade. Given the location of the defect and the proximity to free margins a rhomboid transposition flap was deemed most appropriate. Using a sterile surgical marker, an appropriate rhomboid flap was drawn incorporating the defect. The area thus outlined was incised deep to adipose tissue with a #15 scalpel blade. The skin margins were undermined to an appropriate distance in all directions utilizing iris scissors. Following this, the designed flap was carried over into the primary defect and sutured into place. Bi-Rhombic Flap Text: The defect edges were debeveled with a #15 scalpel blade. Given the location of the defect and the proximity to free margins a bi-rhombic flap was deemed most appropriate. Using a sterile surgical marker, an appropriate rhombic flap was drawn incorporating the defect. The area thus outlined was incised deep to adipose tissue with a #15 scalpel blade. The skin margins were undermined to an appropriate distance in all directions utilizing iris scissors. Following this, the designed flap was carried over into the primary defect and sutured into place. Helical Rim Advancement Flap Text: The defect edges were debeveled with a #15 blade scalpel.  Given the location of the defect and the proximity to free margins (helical rim) a double helical rim advancement flap was deemed most appropriate. Using a sterile surgical marker, the appropriate advancement flaps were drawn incorporating the defect and placing the expected incisions between the helical rim and antihelix where possible.  The area thus outlined was incised through and through with a #15 scalpel blade.  With a skin hook and iris scissors, the flaps were gently and sharply undermined and freed up. Folllowing this, the designed flaps were carried over into the primary defect and sutured into place. Bilateral Helical Rim Advancement Flap Text: The defect edges were debeveled with a #15 blade scalpel.  Given the location of the defect and the proximity to free margins (helical rim) a bilateral helical rim advancement flap was deemed most appropriate. Using a sterile surgical marker, the appropriate advancement flaps were drawn incorporating the defect and placing the expected incisions between the helical rim and antihelix where possible.  The area thus outlined was incised through and through with a #15 scalpel blade.  With a skin hook and iris scissors, the flaps were gently and sharply undermined and freed up. Following this, the designed flaps were placed into the primary defect and sutured into place. Ear Star Wedge Flap Text: The defect edges were debeveled with a #15 blade scalpel.  Given the location of the defect and the proximity to free margins (helical rim) an ear star wedge flap was deemed most appropriate. Using a sterile surgical marker, the appropriate flap was drawn incorporating the defect and placing the expected incisions between the helical rim and antihelix where possible.  The area thus outlined was incised through and through with a #15 scalpel blade. Following this, the designed flap was carried over into the primary defect and sutured into place. Banner Transposition Flap Text: The defect edges were debeveled with a #15 scalpel blade. Given the location of the defect and the proximity to free margins a Banner transposition flap was deemed most appropriate. Using a sterile surgical marker, an appropriate flap was drawn around the defect. The area thus outlined was incised deep to adipose tissue with a #15 scalpel blade. The skin margins were undermined to an appropriate distance in all directions utilizing iris scissors. Following this, the designed flap was carried into the primary defect and sutured into place. Bilobed Flap Text: The defect edges were debeveled with a #15 scalpel blade. Given the location of the defect and the proximity to free margins a bilobe flap was deemed most appropriate. Using a sterile surgical marker, an appropriate bilobe flap drawn around the defect. The area thus outlined was incised deep to adipose tissue with a #15 scalpel blade. The skin margins were undermined to an appropriate distance in all directions utilizing iris scissors. Following this, the designed flap was carried over into the primary defect and sutured into place. Bilobed Transposition Flap Text: The defect edges were debeveled with a #15 scalpel blade. Given the location of the defect and the proximity to free margins a bilobed transposition flap was deemed most appropriate. Using a sterile surgical marker, an appropriate bilobe flap drawn around the defect. The area thus outlined was incised deep to adipose tissue with a #15 scalpel blade. The skin margins were undermined to an appropriate distance in all directions utilizing iris scissors. Following this, the designed flap was carried over into the primary defect and sutured into place. Trilobed Flap Text: The defect edges were debeveled with a #15 scalpel blade. Given the location of the defect and the proximity to free margins a trilobed flap was deemed most appropriate. Using a sterile surgical marker, an appropriate trilobed flap was drawn around the defect. The area thus outlined was incised deep to adipose tissue with a #15 scalpel blade. The skin margins were undermined to an appropriate distance in all directions utilizing iris scissors. Following this, the designed flap was carried into the primary defect and sutured into place. Dorsal Nasal Flap Text: The defect edges were debeveled with a #15 scalpel blade. Given the location of the defect and the proximity to free margins a dorsal nasal flap was deemed most appropriate. Using a sterile surgical marker, an appropriate dorsal nasal flap was drawn around the defect. The area thus outlined was incised deep to adipose tissue with a #15 scalpel blade. The skin margins were undermined to an appropriate distance in all directions utilizing iris scissors. Following this, the designed flap was carried into the primary defect and sutured into place. Island Pedicle Flap Text: The defect edges were debeveled with a #15 scalpel blade. Given the location of the defect, shape of the defect and the proximity to free margins an island pedicle advancement flap was deemed most appropriate. Using a sterile surgical marker, an appropriate advancement flap was drawn incorporating the defect, outlining the appropriate donor tissue and placing the expected incisions within the relaxed skin tension lines where possible. The area thus outlined was incised deep to adipose tissue with a #15 scalpel blade. The skin margins were undermined to an appropriate distance in all directions around the primary defect and laterally outward around the island pedicle utilizing iris scissors.  There was minimal undermining beneath the pedicle flap. Following this, the flap was carried over into the primary defect and sutured into place. Island Pedicle Flap With Canthal Suspension Text: The defect edges were debeveled with a #15 scalpel blade. Given the location of the defect, shape of the defect and the proximity to free margins an island pedicle advancement flap was deemed most appropriate. Using a sterile surgical marker, an appropriate advancement flap was drawn incorporating the defect, outlining the appropriate donor tissue and placing the expected incisions within the relaxed skin tension lines where possible. The area thus outlined was incised deep to adipose tissue with a #15 scalpel blade. The skin margins were undermined to an appropriate distance in all directions around the primary defect and laterally outward around the island pedicle utilizing iris scissors.  There was minimal undermining beneath the pedicle flap. A suspension suture was placed in the canthal tendon to prevent tension and prevent ectropion. Following this, the designed flap was placed into the primary defect and sutured into place. Alar Island Pedicle Flap Text: The defect edges were debeveled with a #15 scalpel blade. Given the location of the defect, shape of the defect and the proximity to the alar rim an island pedicle advancement flap was deemed most appropriate. Using a sterile surgical marker, an appropriate advancement flap was drawn incorporating the defect, outlining the appropriate donor tissue and placing the expected incisions within the nasal ala running parallel to the alar rim. The area thus outlined was incised with a #15 scalpel blade. The skin margins were undermined minimally to an appropriate distance in all directions around the primary defect and laterally outward around the island pedicle utilizing iris scissors.  There was minimal undermining beneath the pedicle flap. Following this, the designed flap was carried over into the primary defect and sutured into place. Double Island Pedicle Flap Text: The defect edges were debeveled with a #15 scalpel blade. Given the location of the defect, shape of the defect and the proximity to free margins a double island pedicle advancement flap was deemed most appropriate. Using a sterile surgical marker, an appropriate advancement flap was drawn incorporating the defect, outlining the appropriate donor tissue and placing the expected incisions within the relaxed skin tension lines where possible. The area thus outlined was incised deep to adipose tissue with a #15 scalpel blade. The skin margins were undermined to an appropriate distance in all directions around the primary defect and laterally outward around the island pedicle utilizing iris scissors.  There was minimal undermining beneath the pedicle flap. Following this, the flap was carried over into the primary defect and sutured into place. Island Pedicle Flap-Requiring Vessel Identification Text: The defect edges were debeveled with a #15 scalpel blade. Given the location of the defect, shape of the defect and the proximity to free margins an island pedicle advancement flap was deemed most appropriate. Using a sterile surgical marker, an appropriate advancement flap was drawn, based on the axial vessel mentioned above, incorporating the defect, outlining the appropriate donor tissue and placing the expected incisions within the relaxed skin tension lines where possible. The area thus outlined was incised deep to adipose tissue with a #15 scalpel blade. The skin margins were undermined to an appropriate distance in all directions around the primary defect and laterally outward around the island pedicle utilizing iris scissors.  There was minimal undermining beneath the pedicle flap. Following this, the designed flap was carried over into the primary defect and sutured into place. Keystone Flap Text: The defect edges were debeveled with a #15 scalpel blade. Given the location of the defect, shape of the defect a keystone flap was deemed most appropriate. Using a sterile surgical marker, an appropriate keystone flap was drawn incorporating the defect, outlining the appropriate donor tissue and placing the expected incisions within the relaxed skin tension lines where possible. The area thus outlined was incised deep to adipose tissue with a #15 scalpel blade. The skin margins were undermined to an appropriate distance in all directions around the primary defect and laterally outward around the flap utilizing iris scissors. Following this, the designed flap was carried into the primary defect and sutured into place. O-T Plasty Text: The defect edges were debeveled with a #15 scalpel blade. Given the location of the defect, shape of the defect and the proximity to free margins an O-T plasty was deemed most appropriate. Using a sterile surgical marker, an appropriate O-T plasty was drawn incorporating the defect and placing the expected incisions within the relaxed skin tension lines where possible. The area thus outlined was incised deep to adipose tissue with a #15 scalpel blade. The skin margins were undermined to an appropriate distance in all directions utilizing iris scissors. Following this, the designed flap was carried over into the primary defect and sutured into place. O-Z Plasty Text: The defect edges were debeveled with a #15 scalpel blade. Given the location of the defect, shape of the defect and the proximity to free margins an O-Z plasty (double transposition flap) was deemed most appropriate. Using a sterile surgical marker, the appropriate transposition flaps were drawn incorporating the defect and placing the expected incisions within the relaxed skin tension lines where possible. The area thus outlined was incised deep to adipose tissue with a #15 scalpel blade. The skin margins were undermined to an appropriate distance in all directions utilizing iris scissors. Hemostasis was achieved with electrocautery. The flaps were then transposed and carried over into place, one clockwise and the other counterclockwise, and anchored with interrupted buried subcutaneous sutures. Double O-Z Plasty Text: The defect edges were debeveled with a #15 scalpel blade. Given the location of the defect, shape of the defect and the proximity to free margins a Double O-Z plasty (double transposition flap) was deemed most appropriate. Using a sterile surgical marker, the appropriate transposition flaps were drawn incorporating the defect and placing the expected incisions within the relaxed skin tension lines where possible. The area thus outlined was incised deep to adipose tissue with a #15 scalpel blade. The skin margins were undermined to an appropriate distance in all directions utilizing iris scissors. Hemostasis was achieved with electrocautery. The flaps were then transposed and carried over into place, one clockwise and the other counterclockwise, and anchored with interrupted buried subcutaneous sutures. V-Y Plasty Text: The defect edges were debeveled with a #15 scalpel blade. Given the location of the defect, shape of the defect and the proximity to free margins an V-Y advancement flap was deemed most appropriate. Using a sterile surgical marker, an appropriate advancement flap was drawn incorporating the defect and placing the expected incisions within the relaxed skin tension lines where possible. The area thus outlined was incised deep to adipose tissue with a #15 scalpel blade. The skin margins were undermined to an appropriate distance in all directions utilizing iris scissors. Following this, the designed flap was advanced and carried over into the primary defect and sutured into place. H Plasty Text: Given the location of the defect, shape of the defect and the proximity to free margins a H-plasty was deemed most appropriate for repair. Using a sterile surgical marker, the appropriate advancement arms of the H-plasty were drawn incorporating the defect and placing the expected incisions within the relaxed skin tension lines where possible. The area thus outlined was incised deep to adipose tissue with a #15 scalpel blade. The skin margins were undermined to an appropriate distance in all directions utilizing iris scissors.  The opposing advancement arms were then advanced and carried over into place in opposite direction and anchored with interrupted buried subcutaneous sutures. W Plasty Text: The lesion was extirpated to the level of the fat with a #15 scalpel blade. Given the location of the defect, shape of the defect and the proximity to free margins a W-plasty was deemed most appropriate for repair. Using a sterile surgical marker, the appropriate transposition arms of the W-plasty were drawn incorporating the defect and placing the expected incisions within the relaxed skin tension lines where possible. The area thus outlined was incised deep to adipose tissue with a #15 scalpel blade. The skin margins were undermined to an appropriate distance in all directions utilizing iris scissors. The opposing transposition arms were then transposed and carried over into place in opposite direction and anchored with interrupted buried subcutaneous sutures. Z Plasty Text: The lesion was extirpated to the level of the fat with a #15 scalpel blade. Given the location of the defect, shape of the defect and the proximity to free margins a Z-plasty was deemed most appropriate for repair. Using a sterile surgical marker, the appropriate transposition arms of the Z-plasty were drawn incorporating the defect and placing the expected incisions within the relaxed skin tension lines where possible. The area thus outlined was incised deep to adipose tissue with a #15 scalpel blade. The skin margins were undermined to an appropriate distance in all directions utilizing iris scissors. The opposing transposition arms were then transposed and carried over into place in opposite direction and anchored with interrupted buried subcutaneous sutures. Double Z Plasty Text: The lesion was extirpated to the level of the fat with a #15 scalpel blade. Given the location of the defect, shape of the defect and the proximity to free margins a double Z-plasty was deemed most appropriate for repair. Using a sterile surgical marker, the appropriate transposition arms of the double Z-plasty were drawn incorporating the defect and placing the expected incisions within the relaxed skin tension lines where possible. The area thus outlined was incised deep to adipose tissue with a #15 scalpel blade. The skin margins were undermined to an appropriate distance in all directions utilizing iris scissors. The opposing transposition arms were then transposed and carried over into place in opposite direction and anchored with interrupted buried subcutaneous sutures. Zygomaticofacial Flap Text: Given the location of the defect, shape of the defect and the proximity to free margins a zygomaticofacial flap was deemed most appropriate for repair. Using a sterile surgical marker, the appropriate flap was drawn incorporating the defect and placing the expected incisions within the relaxed skin tension lines where possible. The area thus outlined was incised deep to adipose tissue with a #15 scalpel blade with preservation of a vascular pedicle.  The skin margins were undermined to an appropriate distance in all directions utilizing iris scissors. The flap was then carried over into the defect and anchored with interrupted buried subcutaneous sutures. Cheek Interpolation Flap Text: A decision was made to reconstruct the defect utilizing an interpolation axial flap and a staged reconstruction.  A telfa template was made of the defect.  This telfa template was then used to outline the Cheek Interpolation flap.  The donor area for the pedicle flap was then injected with anesthesia.  The flap was excised through the skin and subcutaneous tissue down to the layer of the underlying musculature.  The interpolation flap was carefully excised within this deep plane to maintain its blood supply.  The edges of the donor site were undermined.   The donor site was closed in a primary fashion.  The pedicle was then rotated into position and sutured.  Once the tube was sutured into place, adequate blood supply was confirmed with blanching and refill.  The pedicle was then wrapped with xeroform gauze and dressed appropriately with a telfa and gauze bandage to ensure continued blood supply and protect the attached pedicle. Cheek-To-Nose Interpolation Flap Text: A decision was made to reconstruct the defect utilizing an interpolation axial flap and a staged reconstruction.  A telfa template was made of the defect.  This telfa template was then used to outline the Cheek-To-Nose Interpolation flap.  The donor area for the pedicle flap was then injected with anesthesia.  The flap was excised through the skin and subcutaneous tissue down to the layer of the underlying musculature.  The interpolation flap was carefully excised within this deep plane to maintain its blood supply.  The edges of the donor site were undermined.   The donor site was closed in a primary fashion.  The pedicle was then rotated into position and sutured.  Once the tube was sutured into place, adequate blood supply was confirmed with blanching and refill.  The pedicle was then wrapped with xeroform gauze and dressed appropriately with a telfa and gauze bandage to ensure continued blood supply and protect the attached pedicle. Interpolation Flap Text: A decision was made to reconstruct the defect utilizing an interpolation axial flap and a staged reconstruction.  A telfa template was made of the defect.  This telfa template was then used to outline the interpolation flap.  The donor area for the pedicle flap was then injected with anesthesia.  The flap was excised through the skin and subcutaneous tissue down to the layer of the underlying musculature.  The interpolation flap was carefully excised within this deep plane to maintain its blood supply.  The edges of the donor site were undermined.   The donor site was closed in a primary fashion.  The pedicle was then rotated into position and sutured.  Once the tube was sutured into place, adequate blood supply was confirmed with blanching and refill.  The pedicle was then wrapped with xeroform gauze and dressed appropriately with a telfa and gauze bandage to ensure continued blood supply and protect the attached pedicle. Melolabial Interpolation Flap Text: A decision was made to reconstruct the defect utilizing an interpolation axial flap and a staged reconstruction.  A telfa template was made of the defect.  This telfa template was then used to outline the melolabial interpolation flap.  The donor area for the pedicle flap was then injected with anesthesia.  The flap was excised through the skin and subcutaneous tissue down to the layer of the underlying musculature.  The pedicle flap was carefully excised within this deep plane to maintain its blood supply.  The edges of the donor site were undermined.   The donor site was closed in a primary fashion.  The pedicle was then rotated into position and sutured.  Once the tube was sutured into place, adequate blood supply was confirmed with blanching and refill.  The pedicle was then wrapped with xeroform gauze and dressed appropriately with a telfa and gauze bandage to ensure continued blood supply and protect the attached pedicle. Mastoid Interpolation Flap Text: A decision was made to reconstruct the defect utilizing an interpolation axial flap and a staged reconstruction.  A telfa template was made of the defect.  This telfa template was then used to outline the mastoid interpolation flap.  The donor area for the pedicle flap was then injected with anesthesia.  The flap was excised through the skin and subcutaneous tissue down to the layer of the underlying musculature.  The pedicle flap was carefully excised within this deep plane to maintain its blood supply.  The edges of the donor site were undermined.   The donor site was closed in a primary fashion.  The pedicle was then rotated into position and sutured.  Once the tube was sutured into place, adequate blood supply was confirmed with blanching and refill.  The pedicle was then wrapped with xeroform gauze and dressed appropriately with a telfa and gauze bandage to ensure continued blood supply and protect the attached pedicle. Posterior Auricular Interpolation Flap Text: A decision was made to reconstruct the defect utilizing an interpolation axial flap and a staged reconstruction.  A telfa template was made of the defect.  This telfa template was then used to outline the posterior auricular interpolation flap.  The donor area for the pedicle flap was then injected with anesthesia.  The flap was excised through the skin and subcutaneous tissue down to the layer of the underlying musculature.  The pedicle flap was carefully excised within this deep plane to maintain its blood supply.  The edges of the donor site were undermined.   The donor site was closed in a primary fashion.  The pedicle was then rotated into position and sutured.  Once the tube was sutured into place, adequate blood supply was confirmed with blanching and refill.  The pedicle was then wrapped with xeroform gauze and dressed appropriately with a telfa and gauze bandage to ensure continued blood supply and protect the attached pedicle. Paramedian Forehead Flap Text: A decision was made to reconstruct the defect utilizing an interpolation axial flap and a staged reconstruction.  A telfa template was made of the defect.  This telfa template was then used to outline the paramedian forehead pedicle flap.  The donor area for the pedicle flap was then injected with anesthesia.  The flap was excised through the skin and subcutaneous tissue down to the layer of the underlying musculature.  The pedicle flap was carefully excised within this deep plane to maintain its blood supply.  The edges of the donor site were undermined.   The donor site was closed in a primary fashion.  The pedicle was then rotated into position and sutured.  Once the tube was sutured into place, adequate blood supply was confirmed with blanching and refill.  The pedicle was then wrapped with xeroform gauze and dressed appropriately with a telfa and gauze bandage to ensure continued blood supply and protect the attached pedicle. Abbe Flap (Upper To Lower Lip) Text: The defect of the lower lip was assessed and measured.  Given the location and size of the defect, an Abbe flap was deemed most appropriate. Using a sterile surgical marker, an appropriate Abbe flap was measured and drawn on the upper lip. Local anesthesia was then infiltrated.  A scalpel was then used to incise the upper lip through and through the skin, vermilion, muscle and mucosa, leaving the flap pedicled on the opposite side.  The flap was then rotated and transferred to the lower lip defect.  The flap was then sutured into place with a three layer technique, closing the orbicularis oris muscle layer with subcutaneous buried sutures, followed by a mucosal layer and an epidermal layer. Abbe Flap (Lower To Upper Lip) Text: The defect of the upper lip was assessed and measured.  Given the location and size of the defect, an Abbe flap was deemed most appropriate. Using a sterile surgical marker, an appropriate Abbe flap was measured and drawn on the lower lip. Local anesthesia was then infiltrated. A scalpel was then used to incise the upper lip through and through the skin, vermilion, muscle and mucosa, leaving the flap pedicled on the opposite side.  The flap was then rotated and transferred to the lower lip defect.  The flap was then sutured into place with a three layer technique, closing the orbicularis oris muscle layer with subcutaneous buried sutures, followed by a mucosal layer and an epidermal layer. Estlander Flap (Upper To Lower Lip) Text: The defect of the lower lip was assessed and measured.  Given the location and size of the defect, an Estlander flap was deemed most appropriate. Using a sterile surgical marker, an appropriate Estlander flap was measured and drawn on the upper lip. Local anesthesia was then infiltrated. A scalpel was then used to incise the lateral aspect of the flap, through skin, muscle and mucosa, leaving the flap pedicled medially.  The flap was then rotated and positioned to fill the lower lip defect.  The flap was then sutured into place with a three layer technique, closing the orbicularis oris muscle layer with subcutaneous buried sutures, followed by a mucosal layer and an epidermal layer. Cheiloplasty (Less Than 50%) Text: A decision was made to reconstruct the defect with a  cheiloplasty.  The defect was undermined extensively.  Additional orbicularis oris muscle was excised with a 15 blade scalpel.  The defect was converted into a full thickness wedge, of less than 50% of the vertical height of the lip, to facilite a better cosmetic result.  Small vessels were then tied off with 5-0 monocyrl. The orbicularis oris, superficial fascia, adipose and dermis were then reapproximated.  After the deeper layers were approximated the epidermis was reapproximated with particular care given to realign the vermilion border. Cheiloplasty (Complex) Text: A decision was made to reconstruct the defect with a  cheiloplasty.  The defect was undermined extensively.  Additional orbicularis oris muscle was excised with a 15 blade scalpel.  The defect was converted into a full thickness wedge to facilite a better cosmetic result.  Small vessels were then tied off with 5-0 monocyrl. The orbicularis oris, superficial fascia, adipose and dermis were then reapproximated.  After the deeper layers were approximated the epidermis was reapproximated with particular care given to realign the vermilion border. Ear Wedge Repair Text: A wedge excision was completed by carrying down an excision through the full thickness of the ear and cartilage with an inward facing Burow's triangle. The wound was then closed in a layered fashion. Full Thickness Lip Wedge Repair (Flap) Text: Given the location of the defect and the proximity to free margins a full thickness wedge repair was deemed most appropriate. Using a sterile surgical marker, the appropriate repair was drawn incorporating the defect and placing the expected incisions perpendicular to the vermilion border.  The vermilion border was also meticulously outlined to ensure appropriate reapproximation during the repair.  The area thus outlined was incised through and through with a #15 scalpel blade.  The muscularis and dermis were reaproximated with deep sutures following hemostasis. Care was taken to realign the vermilion border before proceeding with the superficial closure.  Once the vermilion was realigned the superfical and mucosal closure was finished. Ftsg Text: The defect edges were debeveled with a #15 scalpel blade. Given the location of the defect, shape of the defect and the proximity to free margins a full thickness skin graft was deemed most appropriate. Using a sterile surgical marker, the primary defect shape was transferred to the donor site. The area thus outlined was incised deep to adipose tissue with a #15 scalpel blade.  The harvested graft was then trimmed of adipose tissue until only dermis and epidermis was left.  The skin margins of the secondary defect were undermined to an appropriate distance in all directions utilizing iris scissors.  The secondary defect was closed with interrupted buried subcutaneous sutures.  The skin edges were then re-apposed with running  sutures.  The skin graft was then placed in the primary defect and oriented appropriately. Split-Thickness Skin Graft Text: The defect edges were debeveled with a #15 scalpel blade. Given the location of the defect, shape of the defect and the proximity to free margins a split thickness skin graft was deemed most appropriate. Using a sterile surgical marker, the primary defect shape was transferred to the donor site. The split thickness graft was then harvested.  The skin graft was then placed in the primary defect and oriented appropriately. Pinch Graft Text: The defect edges were debeveled with a #15 scalpel blade. Given the location of the defect, shape of the defect and the proximity to free margins a pinch graft was deemed most appropriate. Using a sterile surgical marker, the primary defect shape was transferred to the donor site. The area thus outlined was incised deep to adipose tissue with a #15 scalpel blade.  The harvested graft was then trimmed of adipose tissue until only dermis and epidermis was left. The skin margins of the secondary defect were undermined to an appropriate distance in all directions utilizing iris scissors.  The secondary defect was closed with interrupted buried subcutaneous sutures.  The skin edges were then re-apposed with running  sutures.  The skin graft was then placed in the primary defect and oriented appropriately. Burow's Graft Text: The defect edges were debeveled with a #15 scalpel blade. Given the location of the defect, shape of the defect, the proximity to free margins and the presence of a standing cone deformity a Burow's skin graft was deemed most appropriate. The standing cone was removed and this tissue was then trimmed to the shape of the primary defect. The adipose tissue was also removed until only dermis and epidermis were left.  The skin margins of the secondary defect were undermined to an appropriate distance in all directions utilizing iris scissors.  The secondary defect was closed with interrupted buried subcutaneous sutures.  The skin edges were then re-apposed with running  sutures.  The skin graft was then placed in the primary defect and oriented appropriately. Cartilage Graft Text: The defect edges were debeveled with a #15 scalpel blade. Given the location of the defect, shape of the defect, the fact the defect involved a full thickness cartilage defect a cartilage graft was deemed most appropriate.  An appropriate donor site was identified, cleansed, and anesthetized. The cartilage graft was then harvested and transferred to the recipient site, oriented appropriately and then sutured into place.  The secondary defect was then repaired using a primary closure. Composite Graft Text: The defect edges were debeveled with a #15 scalpel blade. Given the location of the defect, shape of the defect, the proximity to free margins and the fact the defect was full thickness a composite graft was deemed most appropriate.  The defect was outline and then transferred to the donor site.  A full thickness graft was then excised from the donor site. The graft was then placed in the primary defect, oriented appropriately and then sutured into place.  The secondary defect was then repaired using a primary closure. Epidermal Autograft Text: The defect edges were debeveled with a #15 scalpel blade. Given the location of the defect, shape of the defect and the proximity to free margins an epidermal autograft was deemed most appropriate. Using a sterile surgical marker, the primary defect shape was transferred to the donor site. The epidermal graft was then harvested.  The skin graft was then placed in the primary defect and oriented appropriately. Dermal Autograft Text: The defect edges were debeveled with a #15 scalpel blade. Given the location of the defect, shape of the defect and the proximity to free margins a dermal autograft was deemed most appropriate. Using a sterile surgical marker, the primary defect shape was transferred to the donor site. The area thus outlined was incised deep to adipose tissue with a #15 scalpel blade.  The harvested graft was then trimmed of adipose and epidermal tissue until only dermis was left.  The skin graft was then placed in the primary defect and oriented appropriately. Skin Substitute Text: The defect edges were debeveled with a #15 scalpel blade. Given the location of the defect, shape of the defect and the proximity to free margins a skin substitute graft was deemed most appropriate.  The graft material was trimmed to fit the size of the defect. The graft was then placed in the primary defect and oriented appropriately. Tissue Cultured Epidermal Autograft Text: The defect edges were debeveled with a #15 scalpel blade. Given the location of the defect, shape of the defect and the proximity to free margins a tissue cultured epidermal autograft was deemed most appropriate.  The graft was then trimmed to fit the size of the defect.  The graft was then placed in the primary defect and oriented appropriately. Xenograft Text: The defect edges were debeveled with a #15 scalpel blade. Given the location of the defect, shape of the defect and the proximity to free margins a xenograft was deemed most appropriate.  The graft was then trimmed to fit the size of the defect.  The graft was then placed in the primary defect and oriented appropriately. Purse String (Simple) Text: Given the location of the defect and the characteristics of the surrounding skin a purse string closure was deemed most appropriate.  Undermining was performed circumferentially around the surgical defect.  A purse string suture was then placed and tightened. Purse String (Intermediate) Text: Given the location of the defect and the characteristics of the surrounding skin a purse string intermediate closure was deemed most appropriate.  Undermining was performed circumferentially around the surgical defect.  A purse string suture was then placed and tightened. Partial Purse String (Simple) Text: Given the location of the defect and the characteristics of the surrounding skin a simple purse string closure was deemed most appropriate.  Undermining was performed circumferentially around the surgical defect.  A purse string suture was then placed and tightened. Wound tension only allowed a partial closure of the circular defect. Partial Purse String (Intermediate) Text: Given the location of the defect and the characteristics of the surrounding skin an intermediate purse string closure was deemed most appropriate.  Undermining was performed circumferentially around the surgical defect.  A purse string suture was then placed and tightened. Wound tension only allowed a partial closure of the circular defect. Localized Dermabrasion With Wire Brush Text: The patient was draped in routine manner.  Localized dermabrasion using 3 x 17 mm wire brush was performed in routine manner to papillary dermis. This spot dermabrasion is being performed to complete skin cancer reconstruction. It also will eliminate the other sun damaged precancerous cells that are known to be part of the regional effect of a lifetime's worth of sun exposure. This localized dermabrasion is therapeutic and should not be considered cosmetic in any regard. Tarsorrhaphy Text: A tarsorrhaphy was performed using Frost sutures. Intermediate Repair And Flap Additional Text (Will Appearing After The Standard Complex Repair Text): The intermediate repair was not sufficient to completely close the primary defect. The remaining additional defect was repaired with the flap mentioned below. Intermediate Repair And Graft Additional Text (Will Appearing After The Standard Complex Repair Text): The intermediate repair was not sufficient to completely close the primary defect. The remaining additional defect was repaired with the graft mentioned below. Complex Repair And Flap Additional Text (Will Appearing After The Standard Complex Repair Text): The complex repair was not sufficient to completely close the primary defect. The remaining additional defect was repaired with the flap mentioned below. Complex Repair And Graft Additional Text (Will Appearing After The Standard Complex Repair Text): The complex repair was not sufficient to completely close the primary defect. The remaining additional defect was repaired with the graft mentioned below. Eyelid Full Thickness Repair - 71221: The eyelid defect was full thickness which required a wedge repair of the eyelid. Special care was taken to ensure that the eyelid margin was realligned when placing sutures. Eyelid Partial Thickness Repair - 88104: The eyelid defect was partial thickness which required a wedge repair of the eyelid. Special care was taken to ensure that the eyelid margin was realligned when placing sutures. Manual Repair Warning Statement: We plan on removing the manually selected variable below in favor of our much easier automatic structured text blocks found in the previous tab. We decided to do this to help make the flow better and give you the full power of structured data. Manual selection is never going to be ideal in our platform and I would encourage you to avoid using manual selection from this point on, especially since I will be sunsetting this feature. It is important that you do one of two things with the customized text below. First, you can save all of the text in a word file so you can have it for future reference. Second, transfer the text to the appropriate area in the Library tab. Lastly, if there is a flap or graft type which we do not have you need to let us know right away so I can add it in before the variable is hidden. No need to panic, we plan to give you roughly 6 months to make the change. Same Histology In Subsequent Stages Text: The pattern and morphology of the tumor is as described in the first stage. No Residual Tumor Seen Histology Text: No persisting tumor was seen upon examination. Since the skin cancer was completely excised in Mohs surgery, there was no malignancy seen at the margin for which the histology can be described; only normal skin was seen, including the normal components of hair follicles, sebaceous glands, epidermis, dermis, and subcutaneous tissue. Inflammation Suggestive Of Cancer Camouflage Histology Text: There was a dense lymphocytic infiltrate which prevented adequate histologic evaluation of adjacent structures. Bcc Histology Text: There were numerous aggregates of basaloid cells. Bcc Infiltrative Histology Text: Full thickness epidermis is visualized around the specimen. Atypical basaloid cells with hyper-chromatic nuclei and mitotic figures are present in small infiltrative nests, extending into or beyond the papillary-reticular dermal interface, as indicated on the mohs map, consistent with aggressive basal cell carcinoma invasive to the deep dermis. Bcc Infundibulocystic Histology Text: : Bcc  Nodular Histology Text: Full thickness epidermis is visualized around the entire specimen. Normal hair follicles and sebaceous glands are present. In the dermis there are atypical basaloid cells in nests invading the papillary dermis, extending into or beyond the papillary- reticular dermal interface, consistent with basal cell carcinoma, as indicated on the mohs map. Bcc Superficial Histology Text: Full thickness epidermis is visualized around the entire specimen. Atypical basaloid nests are present emanating from the follicular epithelium, as indicated on the Mohs map, invasive to the mid dermis consistent with superficial follicular basal cell carcinoma. Scc Histology Text: Full thickness epidermis is visualized around the specimen. Neoplasticism epithelium with prominent squamatization extending into the dermis and into or beyond the [papillary- reticular dermal interface is present, as indicated in the Mohs map, consistent with squamous cell carcinoma. Scc In Situ Histology Text: Full thickness epidermis is visualized around the entire specimen. Normal hair follicles ad sebaceous glands are present. Full thickness keratinocyte atypia is seen, as indicated on the Mohs map consistent with squamous cell carcinoma in situ. Melanoma Histology Text: Atypical melanocytes are present within the epidermis at increased density. Raymond of atypical melanocytes with focal pagetoid and follicular extension are seen, invasive melanoma was absent from the dermis in the sections examined Merkel Cell Carcinoma Histology Text: Sheets and small aggregates of cells with an infiltrative patter. The cells have scant cytoplasm and contain nuclei with a “salt and pepper” chromatin pattern. Afx Histology Text: Atypical spindle cells with irregular hyper-chromatic nuclei infiltrate the papillary dermis; deep invasion is not seen, consistent with atypical fibroxanthoma. Dfsp Histology Text: Monomorphic spindle cell proliferation infiltrating the dermis Mart-1 - Positive Histology Text: MART-1 staining demonstrates areas of higher density and clustering of melanocytes with Pagetoid spread upwards within the epidermis. The surgical margins are positive for tumor cells. Mart-1 - Negative Histology Text: MART-1 staining demonstrates a normal density and pattern of melanocytes along the dermal-epidermal junction. The surgical margins are negative for tumor cells. Information: Selecting Yes will display possible errors in your note based on the variables you have selected. This validation is only offered as a suggestion for you. PLEASE NOTE THAT THE VALIDATION TEXT WILL BE REMOVED WHEN YOU FINALIZE YOUR NOTE. IF YOU WANT TO FAX A PRELIMINARY NOTE YOU WILL NEED TO TOGGLE THIS TO 'NO' IF YOU DO NOT WANT IT IN YOUR FAXED NOTE.

## 2024-06-12 NOTE — PROGRESS NOTE ADULT - ATTENDING COMMENTS
Pt seen and evaluated at bedside  Agree with above  POD 1 s/p pLTCS, doing well   Pain controlled  Increase ambulation   Continue postpartum care     ariela BENJAMIN

## 2024-06-13 VITALS
HEART RATE: 94 BPM | SYSTOLIC BLOOD PRESSURE: 118 MMHG | TEMPERATURE: 99 F | DIASTOLIC BLOOD PRESSURE: 81 MMHG | RESPIRATION RATE: 18 BRPM | OXYGEN SATURATION: 100 %

## 2024-06-13 RX ADMIN — Medication 975 MILLIGRAM(S): at 08:43

## 2024-06-13 RX ADMIN — Medication 600 MILLIGRAM(S): at 06:20

## 2024-06-13 RX ADMIN — SENNA PLUS 2 TABLET(S): 8.6 TABLET ORAL at 00:04

## 2024-06-13 RX ADMIN — Medication 600 MILLIGRAM(S): at 00:04

## 2024-06-13 RX ADMIN — Medication 600 MILLIGRAM(S): at 01:04

## 2024-06-13 RX ADMIN — Medication 600 MILLIGRAM(S): at 05:27

## 2024-06-13 RX ADMIN — HEPARIN SODIUM 10000 UNIT(S): 5000 INJECTION INTRAVENOUS; SUBCUTANEOUS at 05:27

## 2024-06-13 RX ADMIN — Medication 975 MILLIGRAM(S): at 09:13

## 2024-06-13 NOTE — PROGRESS NOTE ADULT - SUBJECTIVE AND OBJECTIVE BOX
ANESTHESIA POSTOP CHECK    34y Female POSTOP DAY 1      Vital Signs Last 24 Hrs  T(C): 36.4 (12 Jun 2024 05:44), Max: 37.1 (12 Jun 2024 01:44)  T(F): 97.6 (12 Jun 2024 05:44), Max: 98.7 (12 Jun 2024 01:44)  HR: 83 (12 Jun 2024 05:44) (62 - 83)  BP: 103/76 (12 Jun 2024 05:44) (87/67 - 127/36)  BP(mean): 71 (11 Jun 2024 17:50) (57 - 97)  RR: 18 (12 Jun 2024 05:44) (17 - 23)  SpO2: 98% (12 Jun 2024 05:44) (97% - 100%)    Parameters below as of 12 Jun 2024 05:44  Patient On (Oxygen Delivery Method): room air            [x ] NO APPARENT ANESTHESIA COMPLICATIONS      
OB Progress Note:  Delivery, POD#1    S: 33yo POD#1 s/p pLTCS for face presentation. Her pain is well controlled. Has not yet tried food or urinated. She is ambulating without difficulty. Denies chest pain, shortness of breath. Denies headaches, blurry vision, epigastric pain, weakness. Denies nausea/vomiting.      O:   Vital Signs Last 24 Hrs  T(C): 37.1 (:), Max: 37.1 (:)  T(F): 98.7 (:), Max: 98.7 (:)  HR: 63 () (62 - 104)  BP: 107/59 () (87/67 - 141/77)  BP(mean): 71 (2024 17:50) (57 - 97)  RR: 17 () (17 - 23)  SpO2: 98% () (90% - 100%)    Parameters below as of   Patient On (Oxygen Delivery Method): room air        Labs:  Blood type: O Positive  Rubella IgG: RPR: Negative                          12.4   14.40<H> >-----------< 267    ( 10 @ 16:15 )             35.8                  PE:  General: NAD  Abdomen: Mildly distended, appropriately tender, incision c/d/i.  Extremities: No erythema, no pitting edema  
SUBJECTIVE:    Pain: Controlled    Complaints: None    MILESTONES:    Alert and Oriented x 3  [ x ]  Out of bed/ ambulating. [ x ]  Flatus:   Positive [ X ]  Negative [  ]  Bowel movement  [  ] Positive [  ] Negative   Voiding [x  ] Due to void [  ]   Mayers/Indwelling catheter in place [  ]  Diet: Regular [ x ]  Clears [  ]  NPO [  ]    Infant feeding:  Breast [  ]   Bottle [  ]  Both [ X ]  Feeding related issues and/or concerns:      OBJECTIVE:  T(C): 36.6 (24 @ 06:09), Max: 36.9 (24 @ 22:04)  HR: 78 (24 @ 06:09) (78 - 82)  BP: 114/72 (24 @ 06:09) (101/55 - 114/72)  RR: 18 (24 @ 06:09) (18 - 18)  SpO2: 100% (24 @ 06:09) (100% - 100%)  Wt(kg): --                        10.2   17.82 )-----------( 231      ( 2024 05:30 )             29.1           Blood Type: O Positive    RPR: Negative          MEDICATIONS  (STANDING):  acetaminophen     Tablet .. 975 milliGRAM(s) Oral <User Schedule>  diphtheria/tetanus/pertussis (acellular) Vaccine (Adacel) 0.5 milliLiter(s) IntraMuscular once  heparin   Injectable 19752 Unit(s) SubCutaneous every 12 hours  ibuprofen  Tablet. 600 milliGRAM(s) Oral every 6 hours  senna 2 Tablet(s) Oral at bedtime    MEDICATIONS  (PRN):  diphenhydrAMINE 25 milliGRAM(s) Oral every 6 hours PRN Pruritus  lanolin Ointment 1 Application(s) Topical every 6 hours PRN Sore Nipples  magnesium hydroxide Suspension 30 milliLiter(s) Oral two times a day PRN Constipation  oxyCODONE    IR 5 milliGRAM(s) Oral every 3 hours PRN Moderate to Severe Pain (4-10)  oxyCODONE    IR 5 milliGRAM(s) Oral once PRN Moderate to Severe Pain (4-10)  simethicone 80 milliGRAM(s) Chew every 4 hours PRN Gas        ASSESSMENT:    34y     G  2   P  1011      PO Day#  2        Delivery: Primary [ X ]    Repeat [  ]       QBL - 743                                  Indication of procedure: Fetal Malpresentation    Condition: Stable    Past Medical History significant for: HPI:  HPI: Pt is a 34y  @40w2d presenting for IOL. Pt seen today with NST with indeterminate baseline and minimal pericardial effusion in fetus sent for IOL. PNC otherwise uncomplicated.  FM (+)  LOF (-)  CTX (-)  VB (-)  GBS pos  EFW: 3300g      OBHx:  SAB sp D&C  GynHx: Denies uterine polyps, ovarian cysts, no abnml paps or STI/STDs. + anterior subserosal/IM myoma [id number]  PMHx: Denies  PSHx: Denies  Med: PNV  All: NKDA  Psych: Denies hx of mental health issues  SH: Denies hx of smoking, drinking, or drug usage during the pregnancy     (10 Alec 2024 15:24)      Current Issues:    Breasts:  Soft [x  ]   Engorged [  ]  Nipples:  Abdomen: Soft [ x ]   Distended [  ] Nontender [  ]     Bowel sounds :  Present [  ]  Absent [  ]   Fundus:  Firm [x  ]  Boggy [  ]    Abdominal incision: Clean, Dry and Intact [x  ]  Staples [  ] Steri Strips [  ] Dermabond [  ] Sutures [  ]    Aquacel [  X  ]    Patient wearing abdominal binder for support.    Vaginal: Lochia:  Heavy [  ]  Moderate [ x ]   Scant [  ]    Extremities: Edema [ X ] Negative Keven's Sign [ X ] Nontender Tod  [ x ] Positive pedal pulses [  ]    Other relevant physical exam findings:  The patient was reminded to remove the Aquacel dressing on day 7.       PLAN:    Plan: Increase ambulation, analgesia PRN and pain medication protocol standing oxycodone, ibuprofen and acetaminophen.    Diet: Regular diet    Continue routine post-operative and postpartum care.     Discharge Planning [ x ]    For Discharge Today  [  X  ]    Consults:  Social Work [  ]  Lactation [ x ]  Other [         ]   
Patient is a 34y old  Female who presents for labor induction    HPI:  HPI: Pt is a 34y  @40w2d presenting for IOL. Pt seen today with NST with indeterminate baseline and minimal pericardial effusion in fetus sent for IOL. PNC otherwise uncomplicated.  FM (+)  LOF (-)  CTX (-)  VB (-)  GBS pos  EFW: 3300g      OBHx:  SAB sp D&C  GynHx: Denies uterine polyps, ovarian cysts, no abnml paps or STI/STDs. + anterior subserosal/IM myoma [id number]  PMHx: Denies  PSHx: Denies  Med: PNV  All: NKDA  Psych: Denies hx of mental health issues  SH: Denies hx of smoking, drinking, or drug usage during the pregnancy     (10 Alec 2024 15:24)      PAST MEDICAL & SURGICAL HISTORY:  No pertinent past medical history          MEDICATIONS  (STANDING):  ampicillin  IVPB 1 Gram(s) IV Intermittent every 4 hours  chlorhexidine 2% Cloths 1 Application(s) Topical daily  citric acid/sodium citrate Solution 30 milliLiter(s) Oral once  famotidine Injectable 20 milliGRAM(s) IV Push once  lactated ringers Bolus 500 milliLiter(s) IV Bolus once  lactated ringers Bolus 1000 milliLiter(s) IV Bolus once  lactated ringers. 1000 milliLiter(s) (125 mL/Hr) IV Continuous <Continuous>  misoprostol Oral Solution 60 MICROGram(s) Oral every 2 hours  oxytocin Infusion 333.333 milliUNIT(s)/Min (1000 mL/Hr) IV Continuous <Continuous>              Vital Signs Last 24 Hrs  T(C): 36.9 (2024 09:04), Max: 37.3 (10 Alec 2024 21:07)  T(F): 98.42 (2024 09:04), Max: 99.14 (10 Alec 2024 21:07)  HR: 104 (2024 10:51) (63 - 104)  BP: 132/75 (2024 10:50) (96/52 - 141/77)  BP(mean): --  RR: 18 (2024 09:04) (16 - 19)  SpO2: 91% (2024 10:51) (88% - 100%)        PHYSICAL EXAM:  Patient has received cytotec and complains of contractions pain.  FHR Tracing is cat 1  Cervix was 4 cms dilated  and the fetus was noted  to be in Face presentation mentum posterior  Presenting part remains high    We discussed the mechanism of labor in face presentation and explained to her that in fetus who present in mentum posterior vaginal delivery is not possible  and a  delivery was adviced. The patient and her partner want to think about this recommendation and will confer with her primary physician      I&O's Summary    10 Alec 2024 07:01  -  2024 07:00  --------------------------------------------------------  IN: 1750 mL / OUT: 0 mL / NET: 1750 mL        LABORATORY:                        12.4   14.40 )-----------( 267      ( 10 Alec 2024 16:15 )             35.8

## 2024-07-24 ENCOUNTER — APPOINTMENT (OUTPATIENT)
Dept: OBGYN | Facility: CLINIC | Age: 34
End: 2024-07-24
Payer: COMMERCIAL

## 2024-07-24 VITALS
HEIGHT: 61 IN | DIASTOLIC BLOOD PRESSURE: 87 MMHG | BODY MASS INDEX: 37.38 KG/M2 | SYSTOLIC BLOOD PRESSURE: 127 MMHG | HEART RATE: 62 BPM | WEIGHT: 198 LBS

## 2024-07-24 PROCEDURE — 0503F POSTPARTUM CARE VISIT: CPT

## 2024-07-24 NOTE — HISTORY OF PRESENT ILLNESS
[Delivery Date: ___] : on [unfilled] [Female] : Delivery History: baby girl [Girl] : baby is a girl [Infant's Name ___] : [unfilled] [___ Lbs] : [unfilled] lbs [Living at Home] : is currently living at home [Bottle Feeding] : bottle feeding [Breastfeeding] : currently nursing [Resumed Menses] : has not resumed her menses [Resumed Aline] : has not resumed intercourse [Intended Contraception] : the patient does not intended to use contraception postpartum [Clean/Dry/Intact] : clean, dry and intact [Erythema] : not erythematous [Swelling] : not swollen [Dehiscence] : not dehisced [Healed] : healed [Back to Normal] : is back to normal in size [Normal] : the vagina was normal [Cervix Sample Taken] : cervical sample not taken for a Pap smear [Examination Of The Breasts] : breasts are normal [None] : None [de-identified] : Doing well, bonding well with baby, breast and bottle feeding but difficult with production; good support at home [de-identified] : Rectal bleeding, reports hard stools, denies pelvic pain, bloating, change in appetite [de-identified] : 35 y/o s/p pLTCS for PP visit, doing well [de-identified] : EPDS score reviewed; lactation consultant provided; likely internal hemorrhoids, encouraged colace, hydration, high fiber diet; patient has FH colorectal ca so will give referral to GI; declines hormonal contraception; can resume normal activity; RTO for annual or PRN

## 2024-07-29 ENCOUNTER — APPOINTMENT (OUTPATIENT)
Age: 34
End: 2024-07-29
Payer: COMMERCIAL

## 2024-07-29 PROCEDURE — S9443: CPT | Mod: 95

## 2024-08-01 ENCOUNTER — APPOINTMENT (OUTPATIENT)
Age: 34
End: 2024-08-01

## 2024-08-14 ENCOUNTER — APPOINTMENT (OUTPATIENT)
Dept: GASTROENTEROLOGY | Facility: CLINIC | Age: 34
End: 2024-08-14

## 2025-01-06 ENCOUNTER — APPOINTMENT (OUTPATIENT)
Dept: OBGYN | Facility: CLINIC | Age: 35
End: 2025-01-06
Payer: COMMERCIAL

## 2025-01-06 VITALS
HEART RATE: 72 BPM | HEIGHT: 61 IN | BODY MASS INDEX: 35.68 KG/M2 | DIASTOLIC BLOOD PRESSURE: 77 MMHG | WEIGHT: 189 LBS | SYSTOLIC BLOOD PRESSURE: 118 MMHG

## 2025-01-06 DIAGNOSIS — Z01.419 ENCOUNTER FOR GYNECOLOGICAL EXAMINATION (GENERAL) (ROUTINE) W/OUT ABNORMAL FINDINGS: ICD-10-CM

## 2025-01-06 PROCEDURE — 99395 PREV VISIT EST AGE 18-39: CPT

## 2025-01-06 PROCEDURE — 99459 PELVIC EXAMINATION: CPT

## 2025-01-08 LAB
CYTOLOGY CVX/VAG DOC THIN PREP: NORMAL
HPV HIGH+LOW RISK DNA PNL CVX: NOT DETECTED